# Patient Record
Sex: MALE | Race: WHITE | NOT HISPANIC OR LATINO | Employment: UNEMPLOYED | RURAL
[De-identification: names, ages, dates, MRNs, and addresses within clinical notes are randomized per-mention and may not be internally consistent; named-entity substitution may affect disease eponyms.]

---

## 2020-08-10 ENCOUNTER — HISTORICAL (OUTPATIENT)
Dept: ADMINISTRATIVE | Facility: HOSPITAL | Age: 55
End: 2020-08-10

## 2020-08-10 LAB
25(OH)D3 SERPL-MCNC: 34.1 NG/ML
ALBUMIN SERPL BCP-MCNC: 4 G/DL (ref 3.5–5)
ALBUMIN/GLOB SERPL: 1.2 {RATIO}
ALP SERPL-CCNC: 84 U/L (ref 45–115)
ALT SERPL W P-5'-P-CCNC: 27 U/L (ref 16–61)
ANION GAP SERPL CALCULATED.3IONS-SCNC: 10 MMOL/L (ref 7–16)
AST SERPL W P-5'-P-CCNC: 21 U/L (ref 15–37)
BASOPHILS # BLD AUTO: 0.1 X10E3/UL (ref 0–0.2)
BASOPHILS NFR BLD AUTO: 0.9 % (ref 0–1)
BILIRUB SERPL-MCNC: 0.7 MG/DL (ref 0–1.2)
BUN SERPL-MCNC: 11 MG/DL (ref 7–18)
BUN SERPL-MCNC: 11 MG/DL (ref 7–18)
BUN/CREAT SERPL: 12
CALCIUM SERPL-MCNC: 8.8 MG/DL (ref 8.5–10.1)
CHLORIDE SERPL-SCNC: 104 MMOL/L (ref 98–107)
CO2 SERPL-SCNC: 30 MMOL/L (ref 21–32)
CREAT SERPL-MCNC: 0.89 MG/DL (ref 0.7–1.3)
EOSINOPHIL # BLD AUTO: 0.68 X10E3/UL (ref 0–0.5)
EOSINOPHIL NFR BLD AUTO: 6.2 % (ref 1–4)
ERYTHROCYTE [DISTWIDTH] IN BLOOD BY AUTOMATED COUNT: 14.1 % (ref 11.5–14.5)
GLOBULIN SER-MCNC: 3.3 G/DL (ref 2–4)
GLUCOSE SERPL-MCNC: 91 MG/DL (ref 74–106)
HCT VFR BLD AUTO: 46.6 % (ref 40–54)
HGB BLD-MCNC: 14.8 G/DL (ref 13.5–18)
IMM GRANULOCYTES # BLD AUTO: 0.03 X10E3/UL (ref 0–0.04)
IMM GRANULOCYTES NFR BLD: 0.3 % (ref 0–0.4)
LYMPHOCYTES # BLD AUTO: 2.98 X10E3/UL (ref 1–4.8)
LYMPHOCYTES NFR BLD AUTO: 27.3 % (ref 27–41)
MCH RBC QN AUTO: 29 PG (ref 27–31)
MCHC RBC AUTO-ENTMCNC: 31.8 G/DL (ref 32–36)
MCV RBC AUTO: 91.4 FL (ref 80–96)
MONOCYTES # BLD AUTO: 0.71 X10E3/UL (ref 0–0.8)
MONOCYTES NFR BLD AUTO: 6.5 % (ref 2–6)
MPC BLD CALC-MCNC: 11.6 FL (ref 9.4–12.4)
NEUTROPHILS # BLD AUTO: 6.43 X10E3/UL (ref 1.8–7.7)
NEUTROPHILS NFR BLD AUTO: 58.8 % (ref 53–65)
NRBC # BLD AUTO: 0 X10E3/UL (ref 0–0)
NRBC, AUTO (.00): 0 /100 (ref 0–0)
PLATELET # BLD AUTO: 261 X10E3/UL (ref 150–400)
POTASSIUM SERPL-SCNC: 4.1 MMOL/L (ref 3.5–5.1)
PROT SERPL-MCNC: 7.3 G/DL (ref 6.4–8.2)
RBC # BLD AUTO: 5.1 X10E6/UL (ref 4.6–6.2)
SODIUM SERPL-SCNC: 140 MMOL/L (ref 136–145)
VIT B12 SERPL-MCNC: 580 PG/ML (ref 193–986)
WBC # BLD AUTO: 10.93 X10E3/UL (ref 4.5–11)

## 2020-09-14 ENCOUNTER — HISTORICAL (OUTPATIENT)
Dept: ADMINISTRATIVE | Facility: HOSPITAL | Age: 55
End: 2020-09-14

## 2020-09-14 LAB — NT-PROBNP SERPL-MCNC: 56 PG/ML (ref 1–125)

## 2022-03-10 ENCOUNTER — OFFICE VISIT (OUTPATIENT)
Dept: FAMILY MEDICINE | Facility: CLINIC | Age: 57
End: 2022-03-10
Payer: MEDICARE

## 2022-03-10 VITALS
RESPIRATION RATE: 18 BRPM | WEIGHT: 231.63 LBS | SYSTOLIC BLOOD PRESSURE: 130 MMHG | BODY MASS INDEX: 32.43 KG/M2 | DIASTOLIC BLOOD PRESSURE: 85 MMHG | HEIGHT: 71 IN | TEMPERATURE: 97 F | HEART RATE: 76 BPM | OXYGEN SATURATION: 95 %

## 2022-03-10 DIAGNOSIS — Z12.5 SCREENING FOR PROSTATE CANCER: ICD-10-CM

## 2022-03-10 DIAGNOSIS — F41.9 ANXIETY: ICD-10-CM

## 2022-03-10 DIAGNOSIS — J44.9 CHRONIC OBSTRUCTIVE PULMONARY DISEASE, UNSPECIFIED COPD TYPE: Primary | ICD-10-CM

## 2022-03-10 DIAGNOSIS — Z13.1 SCREENING FOR DIABETES MELLITUS: ICD-10-CM

## 2022-03-10 DIAGNOSIS — K21.9 GASTROESOPHAGEAL REFLUX DISEASE, UNSPECIFIED WHETHER ESOPHAGITIS PRESENT: ICD-10-CM

## 2022-03-10 DIAGNOSIS — E78.5 HYPERLIPIDEMIA, UNSPECIFIED HYPERLIPIDEMIA TYPE: ICD-10-CM

## 2022-03-10 DIAGNOSIS — R53.83 FATIGUE, UNSPECIFIED TYPE: ICD-10-CM

## 2022-03-10 LAB
ALBUMIN SERPL BCP-MCNC: 3.6 G/DL (ref 3.5–5)
ALBUMIN/GLOB SERPL: 0.9 {RATIO}
ALP SERPL-CCNC: 118 U/L (ref 45–115)
ALT SERPL W P-5'-P-CCNC: 28 U/L (ref 16–61)
ANION GAP SERPL CALCULATED.3IONS-SCNC: 9 MMOL/L (ref 7–16)
AST SERPL W P-5'-P-CCNC: 20 U/L (ref 15–37)
BASOPHILS # BLD AUTO: 0.08 K/UL (ref 0–0.2)
BASOPHILS NFR BLD AUTO: 0.6 % (ref 0–1)
BILIRUB SERPL-MCNC: 0.7 MG/DL (ref 0–1.2)
BUN SERPL-MCNC: 8 MG/DL (ref 7–18)
BUN/CREAT SERPL: 7 (ref 6–20)
CALCIUM SERPL-MCNC: 9.1 MG/DL (ref 8.5–10.1)
CHLORIDE SERPL-SCNC: 102 MMOL/L (ref 98–107)
CHOLEST SERPL-MCNC: 149 MG/DL (ref 0–200)
CHOLEST/HDLC SERPL: 3.4 {RATIO}
CO2 SERPL-SCNC: 28 MMOL/L (ref 21–32)
CREAT SERPL-MCNC: 1.08 MG/DL (ref 0.7–1.3)
DIFFERENTIAL METHOD BLD: ABNORMAL
EOSINOPHIL # BLD AUTO: 0.2 K/UL (ref 0–0.5)
EOSINOPHIL NFR BLD AUTO: 1.5 % (ref 1–4)
ERYTHROCYTE [DISTWIDTH] IN BLOOD BY AUTOMATED COUNT: 14 % (ref 11.5–14.5)
GLOBULIN SER-MCNC: 4.1 G/DL (ref 2–4)
GLUCOSE SERPL-MCNC: 87 MG/DL (ref 74–106)
HCT VFR BLD AUTO: 51.8 % (ref 40–54)
HDLC SERPL-MCNC: 44 MG/DL (ref 40–60)
HGB BLD-MCNC: 16.6 G/DL (ref 13.5–18)
IMM GRANULOCYTES # BLD AUTO: 0.03 K/UL (ref 0–0.04)
IMM GRANULOCYTES NFR BLD: 0.2 % (ref 0–0.4)
LDLC SERPL CALC-MCNC: 87 MG/DL
LDLC/HDLC SERPL: 2 {RATIO}
LYMPHOCYTES # BLD AUTO: 3.17 K/UL (ref 1–4.8)
LYMPHOCYTES NFR BLD AUTO: 24.3 % (ref 27–41)
MCH RBC QN AUTO: 28.3 PG (ref 27–31)
MCHC RBC AUTO-ENTMCNC: 32 G/DL (ref 32–36)
MCV RBC AUTO: 88.4 FL (ref 80–96)
MONOCYTES # BLD AUTO: 0.73 K/UL (ref 0–0.8)
MONOCYTES NFR BLD AUTO: 5.6 % (ref 2–6)
MPC BLD CALC-MCNC: 11.5 FL (ref 9.4–12.4)
NEUTROPHILS # BLD AUTO: 8.84 K/UL (ref 1.8–7.7)
NEUTROPHILS NFR BLD AUTO: 67.8 % (ref 53–65)
NONHDLC SERPL-MCNC: 105 MG/DL
NRBC # BLD AUTO: 0 X10E3/UL
NRBC, AUTO (.00): 0 %
PLATELET # BLD AUTO: 287 K/UL (ref 150–400)
POTASSIUM SERPL-SCNC: 3.9 MMOL/L (ref 3.5–5.1)
PROT SERPL-MCNC: 7.7 G/DL (ref 6.4–8.2)
PSA SERPL-MCNC: 0.96 NG/ML (ref 0–3.1)
RBC # BLD AUTO: 5.86 M/UL (ref 4.6–6.2)
SODIUM SERPL-SCNC: 135 MMOL/L (ref 136–145)
TRIGL SERPL-MCNC: 90 MG/DL (ref 35–150)
TSH SERPL DL<=0.005 MIU/L-ACNC: 1.81 UIU/ML (ref 0.36–3.74)
VLDLC SERPL-MCNC: 18 MG/DL
WBC # BLD AUTO: 13.05 K/UL (ref 4.5–11)

## 2022-03-10 PROCEDURE — 1159F MED LIST DOCD IN RCRD: CPT | Mod: ,,, | Performed by: NURSE PRACTITIONER

## 2022-03-10 PROCEDURE — 99204 OFFICE O/P NEW MOD 45 MIN: CPT | Mod: ,,, | Performed by: NURSE PRACTITIONER

## 2022-03-10 PROCEDURE — 3008F PR BODY MASS INDEX (BMI) DOCUMENTED: ICD-10-PCS | Mod: ,,, | Performed by: NURSE PRACTITIONER

## 2022-03-10 PROCEDURE — G0103 PSA SCREENING: HCPCS | Mod: ,,, | Performed by: CLINICAL MEDICAL LABORATORY

## 2022-03-10 PROCEDURE — 1160F RVW MEDS BY RX/DR IN RCRD: CPT | Mod: ,,, | Performed by: NURSE PRACTITIONER

## 2022-03-10 PROCEDURE — 3008F BODY MASS INDEX DOCD: CPT | Mod: ,,, | Performed by: NURSE PRACTITIONER

## 2022-03-10 PROCEDURE — 3079F DIAST BP 80-89 MM HG: CPT | Mod: ,,, | Performed by: NURSE PRACTITIONER

## 2022-03-10 PROCEDURE — 80061 LIPID PANEL: ICD-10-PCS | Mod: ,,, | Performed by: CLINICAL MEDICAL LABORATORY

## 2022-03-10 PROCEDURE — 80061 LIPID PANEL: CPT | Mod: ,,, | Performed by: CLINICAL MEDICAL LABORATORY

## 2022-03-10 PROCEDURE — G0103 PSA, SCREENING: ICD-10-PCS | Mod: ,,, | Performed by: CLINICAL MEDICAL LABORATORY

## 2022-03-10 PROCEDURE — 99204 PR OFFICE/OUTPT VISIT, NEW, LEVL IV, 45-59 MIN: ICD-10-PCS | Mod: ,,, | Performed by: NURSE PRACTITIONER

## 2022-03-10 PROCEDURE — 1159F PR MEDICATION LIST DOCUMENTED IN MEDICAL RECORD: ICD-10-PCS | Mod: ,,, | Performed by: NURSE PRACTITIONER

## 2022-03-10 PROCEDURE — 83036 HEMOGLOBIN A1C: ICD-10-PCS | Mod: GZ,,, | Performed by: CLINICAL MEDICAL LABORATORY

## 2022-03-10 PROCEDURE — 84443 ASSAY THYROID STIM HORMONE: CPT | Mod: ,,, | Performed by: CLINICAL MEDICAL LABORATORY

## 2022-03-10 PROCEDURE — 80053 COMPREHEN METABOLIC PANEL: CPT | Mod: ,,, | Performed by: CLINICAL MEDICAL LABORATORY

## 2022-03-10 PROCEDURE — 1160F PR REVIEW ALL MEDS BY PRESCRIBER/CLIN PHARMACIST DOCUMENTED: ICD-10-PCS | Mod: ,,, | Performed by: NURSE PRACTITIONER

## 2022-03-10 PROCEDURE — 80053 COMPREHENSIVE METABOLIC PANEL: ICD-10-PCS | Mod: ,,, | Performed by: CLINICAL MEDICAL LABORATORY

## 2022-03-10 PROCEDURE — 84443 TSH: ICD-10-PCS | Mod: ,,, | Performed by: CLINICAL MEDICAL LABORATORY

## 2022-03-10 PROCEDURE — 3075F SYST BP GE 130 - 139MM HG: CPT | Mod: ,,, | Performed by: NURSE PRACTITIONER

## 2022-03-10 PROCEDURE — 83036 HEMOGLOBIN GLYCOSYLATED A1C: CPT | Mod: GZ,,, | Performed by: CLINICAL MEDICAL LABORATORY

## 2022-03-10 PROCEDURE — 85025 COMPLETE CBC W/AUTO DIFF WBC: CPT | Mod: ,,, | Performed by: CLINICAL MEDICAL LABORATORY

## 2022-03-10 PROCEDURE — 3079F PR MOST RECENT DIASTOLIC BLOOD PRESSURE 80-89 MM HG: ICD-10-PCS | Mod: ,,, | Performed by: NURSE PRACTITIONER

## 2022-03-10 PROCEDURE — 85025 CBC WITH DIFFERENTIAL: ICD-10-PCS | Mod: ,,, | Performed by: CLINICAL MEDICAL LABORATORY

## 2022-03-10 PROCEDURE — 3075F PR MOST RECENT SYSTOLIC BLOOD PRESS GE 130-139MM HG: ICD-10-PCS | Mod: ,,, | Performed by: NURSE PRACTITIONER

## 2022-03-10 RX ORDER — ALBUTEROL SULFATE 90 UG/1
AEROSOL, METERED RESPIRATORY (INHALATION)
Qty: 18 G | Refills: 2 | Status: SHIPPED | OUTPATIENT
Start: 2022-03-10 | End: 2022-08-22 | Stop reason: SDUPTHER

## 2022-03-10 RX ORDER — ALBUTEROL SULFATE 90 UG/1
AEROSOL, METERED RESPIRATORY (INHALATION)
COMMUNITY
End: 2022-03-10 | Stop reason: SDUPTHER

## 2022-03-10 RX ORDER — BUSPIRONE HYDROCHLORIDE 10 MG/1
1 TABLET ORAL DAILY
COMMUNITY
End: 2022-03-10 | Stop reason: SDUPTHER

## 2022-03-10 RX ORDER — ATORVASTATIN CALCIUM 40 MG/1
1 TABLET, FILM COATED ORAL DAILY
COMMUNITY
End: 2022-03-10 | Stop reason: SDUPTHER

## 2022-03-10 RX ORDER — ATORVASTATIN CALCIUM 40 MG/1
40 TABLET, FILM COATED ORAL DAILY
Qty: 90 TABLET | Refills: 1 | Status: SHIPPED | OUTPATIENT
Start: 2022-03-10 | End: 2022-08-22 | Stop reason: SDUPTHER

## 2022-03-10 RX ORDER — ALBUTEROL SULFATE 0.83 MG/ML
2.5 SOLUTION RESPIRATORY (INHALATION) 2 TIMES DAILY
Qty: 3 ML | Refills: 5 | Status: SHIPPED | OUTPATIENT
Start: 2022-03-10 | End: 2023-01-12 | Stop reason: SDUPTHER

## 2022-03-10 RX ORDER — BUDESONIDE AND FORMOTEROL FUMARATE DIHYDRATE 160; 4.5 UG/1; UG/1
AEROSOL RESPIRATORY (INHALATION)
Qty: 10.2 G | Refills: 5 | Status: SHIPPED | OUTPATIENT
Start: 2022-03-10 | End: 2022-08-22

## 2022-03-10 RX ORDER — BUDESONIDE AND FORMOTEROL FUMARATE DIHYDRATE 160; 4.5 UG/1; UG/1
AEROSOL RESPIRATORY (INHALATION)
COMMUNITY
End: 2022-03-10 | Stop reason: SDUPTHER

## 2022-03-10 RX ORDER — PANTOPRAZOLE SODIUM 40 MG/1
40 TABLET, DELAYED RELEASE ORAL DAILY
Qty: 90 TABLET | Refills: 1 | Status: SHIPPED | OUTPATIENT
Start: 2022-03-10 | End: 2022-08-22 | Stop reason: SDUPTHER

## 2022-03-10 RX ORDER — TIOTROPIUM BROMIDE 18 UG/1
18 CAPSULE ORAL; RESPIRATORY (INHALATION) DAILY
Qty: 60 CAPSULE | Refills: 5 | Status: SHIPPED | OUTPATIENT
Start: 2022-03-10 | End: 2022-08-22 | Stop reason: ALTCHOICE

## 2022-03-10 RX ORDER — ALBUTEROL SULFATE 0.83 MG/ML
3 SOLUTION RESPIRATORY (INHALATION)
COMMUNITY
End: 2022-03-10 | Stop reason: SDUPTHER

## 2022-03-10 RX ORDER — BUSPIRONE HYDROCHLORIDE 10 MG/1
10 TABLET ORAL DAILY
Qty: 90 TABLET | Refills: 1 | Status: SHIPPED | OUTPATIENT
Start: 2022-03-10 | End: 2022-05-19

## 2022-03-10 RX ORDER — PANTOPRAZOLE SODIUM 40 MG/1
1 TABLET, DELAYED RELEASE ORAL DAILY
COMMUNITY
End: 2022-03-10 | Stop reason: SDUPTHER

## 2022-03-10 RX ORDER — GABAPENTIN 300 MG/1
300 CAPSULE ORAL 2 TIMES DAILY
Qty: 270 CAPSULE | Refills: 1 | Status: SHIPPED | OUTPATIENT
Start: 2022-03-10 | End: 2022-05-19

## 2022-03-10 RX ORDER — GABAPENTIN 300 MG/1
300 CAPSULE ORAL 2 TIMES DAILY
COMMUNITY
End: 2022-03-10 | Stop reason: SDUPTHER

## 2022-03-10 NOTE — PROGRESS NOTES
Subjective:       Patient ID: Andrez Wen is a 56 y.o. male.    Chief Complaint: Establish Care (COPD,  Pt is non fasting )    Mr. Wen presents to clinic to establish care, he is requesting medication refills. He reports that he recently got medical insurance coverage and was going to the [facility] in the past. He states he was a patient of Dr. Gee in the past. He denies complaints, reports he was in [facility] approximately 4 years ago for two weeks and had an extensive work up.    Review of Systems   Constitutional: Negative.    Respiratory: Positive for shortness of breath and wheezing.    Cardiovascular: Negative.    Gastrointestinal: Negative.    Genitourinary: Negative.    Musculoskeletal: Positive for back pain.   Neurological: Negative.    Psychiatric/Behavioral: Negative.          Objective:      Physical Exam  Vitals and nursing note reviewed.   Constitutional:       Appearance: Normal appearance.   HENT:      Head: Normocephalic.   Eyes:      Pupils: Pupils are equal, round, and reactive to light.   Cardiovascular:      Rate and Rhythm: Normal rate and regular rhythm.      Pulses: Normal pulses.      Heart sounds: Normal heart sounds.   Pulmonary:      Effort: Pulmonary effort is normal. No respiratory distress.      Breath sounds: Wheezing present.   Abdominal:      General: Bowel sounds are normal.      Palpations: Abdomen is soft.   Musculoskeletal:         General: Tenderness present. Normal range of motion.      Cervical back: Normal range of motion.      Right lower leg: No edema.      Left lower leg: No edema.   Skin:     General: Skin is warm and dry.   Neurological:      Mental Status: He is alert and oriented to person, place, and time.   Psychiatric:         Behavior: Behavior normal.         Assessment:       Problem List Items Addressed This Visit        Psychiatric    Anxiety       Pulmonary    Chronic obstructive pulmonary disease - Primary    Relevant Orders     Comprehensive Metabolic Panel       Cardiac/Vascular    Hyperlipidemia    Relevant Orders    Lipid Panel    CBC Auto Differential       GI    Gastroesophageal reflux disease    Relevant Orders    CBC Auto Differential    Comprehensive Metabolic Panel      Other Visit Diagnoses     Fatigue, unspecified type        Relevant Orders    TSH    Screening for prostate cancer        Relevant Orders    PSA, Screening    Screening for diabetes mellitus        Relevant Orders    Hemoglobin A1C          Plan:        1. Fasting labs today, schedule follow up appointment with Dr. Gee.   2. Refill medications, no changes made today.    3. Smoking cessation counseling.    4. [facility] records to chart from 4 years ago, he reports extensive work up at that time.

## 2022-03-11 LAB
EST. AVERAGE GLUCOSE BLD GHB EST-MCNC: 104 MG/DL
HBA1C MFR BLD HPLC: 5.7 % (ref 4.5–6.6)

## 2022-05-19 ENCOUNTER — OFFICE VISIT (OUTPATIENT)
Dept: FAMILY MEDICINE | Facility: CLINIC | Age: 57
End: 2022-05-19
Payer: MEDICARE

## 2022-05-19 VITALS
TEMPERATURE: 97 F | DIASTOLIC BLOOD PRESSURE: 80 MMHG | BODY MASS INDEX: 32.34 KG/M2 | SYSTOLIC BLOOD PRESSURE: 136 MMHG | WEIGHT: 231 LBS | OXYGEN SATURATION: 97 % | RESPIRATION RATE: 18 BRPM | HEART RATE: 71 BPM | HEIGHT: 71 IN

## 2022-05-19 DIAGNOSIS — F41.9 ANXIETY: ICD-10-CM

## 2022-05-19 DIAGNOSIS — G89.29 CHRONIC MIDLINE LOW BACK PAIN WITHOUT SCIATICA: Primary | ICD-10-CM

## 2022-05-19 DIAGNOSIS — J44.9 CHRONIC OBSTRUCTIVE PULMONARY DISEASE, UNSPECIFIED COPD TYPE: ICD-10-CM

## 2022-05-19 DIAGNOSIS — K21.9 GASTROESOPHAGEAL REFLUX DISEASE, UNSPECIFIED WHETHER ESOPHAGITIS PRESENT: ICD-10-CM

## 2022-05-19 DIAGNOSIS — E78.5 HYPERLIPIDEMIA, UNSPECIFIED HYPERLIPIDEMIA TYPE: ICD-10-CM

## 2022-05-19 DIAGNOSIS — M54.50 CHRONIC MIDLINE LOW BACK PAIN WITHOUT SCIATICA: Primary | ICD-10-CM

## 2022-05-19 PROCEDURE — 99213 PR OFFICE/OUTPT VISIT, EST, LEVL III, 20-29 MIN: ICD-10-PCS | Mod: ,,, | Performed by: NURSE PRACTITIONER

## 2022-05-19 PROCEDURE — 1159F PR MEDICATION LIST DOCUMENTED IN MEDICAL RECORD: ICD-10-PCS | Mod: ,,, | Performed by: NURSE PRACTITIONER

## 2022-05-19 PROCEDURE — 3079F DIAST BP 80-89 MM HG: CPT | Mod: ,,, | Performed by: NURSE PRACTITIONER

## 2022-05-19 PROCEDURE — 1160F PR REVIEW ALL MEDS BY PRESCRIBER/CLIN PHARMACIST DOCUMENTED: ICD-10-PCS | Mod: ,,, | Performed by: NURSE PRACTITIONER

## 2022-05-19 PROCEDURE — 3075F PR MOST RECENT SYSTOLIC BLOOD PRESS GE 130-139MM HG: ICD-10-PCS | Mod: ,,, | Performed by: NURSE PRACTITIONER

## 2022-05-19 PROCEDURE — 3079F PR MOST RECENT DIASTOLIC BLOOD PRESSURE 80-89 MM HG: ICD-10-PCS | Mod: ,,, | Performed by: NURSE PRACTITIONER

## 2022-05-19 PROCEDURE — 3008F BODY MASS INDEX DOCD: CPT | Mod: ,,, | Performed by: NURSE PRACTITIONER

## 2022-05-19 PROCEDURE — 3044F PR MOST RECENT HEMOGLOBIN A1C LEVEL <7.0%: ICD-10-PCS | Mod: ,,, | Performed by: NURSE PRACTITIONER

## 2022-05-19 PROCEDURE — 3044F HG A1C LEVEL LT 7.0%: CPT | Mod: ,,, | Performed by: NURSE PRACTITIONER

## 2022-05-19 PROCEDURE — 1160F RVW MEDS BY RX/DR IN RCRD: CPT | Mod: ,,, | Performed by: NURSE PRACTITIONER

## 2022-05-19 PROCEDURE — 1159F MED LIST DOCD IN RCRD: CPT | Mod: ,,, | Performed by: NURSE PRACTITIONER

## 2022-05-19 PROCEDURE — 3008F PR BODY MASS INDEX (BMI) DOCUMENTED: ICD-10-PCS | Mod: ,,, | Performed by: NURSE PRACTITIONER

## 2022-05-19 PROCEDURE — 3075F SYST BP GE 130 - 139MM HG: CPT | Mod: ,,, | Performed by: NURSE PRACTITIONER

## 2022-05-19 PROCEDURE — 99213 OFFICE O/P EST LOW 20 MIN: CPT | Mod: ,,, | Performed by: NURSE PRACTITIONER

## 2022-05-19 RX ORDER — GABAPENTIN 300 MG/1
300 CAPSULE ORAL 2 TIMES DAILY
Qty: 270 CAPSULE | Refills: 1 | Status: SHIPPED | OUTPATIENT
Start: 2022-05-19 | End: 2022-05-19

## 2022-05-19 RX ORDER — GABAPENTIN 300 MG/1
600 CAPSULE ORAL 2 TIMES DAILY
Qty: 360 CAPSULE | Refills: 1 | Status: SHIPPED | OUTPATIENT
Start: 2022-05-19 | End: 2022-08-22 | Stop reason: SDUPTHER

## 2022-05-19 RX ORDER — MELOXICAM 7.5 MG/1
7.5 TABLET ORAL DAILY
Qty: 30 TABLET | Refills: 1 | Status: SHIPPED | OUTPATIENT
Start: 2022-05-19 | End: 2022-08-18

## 2022-05-19 RX ORDER — CYCLOBENZAPRINE HCL 10 MG
10 TABLET ORAL 3 TIMES DAILY PRN
Qty: 30 TABLET | Refills: 1 | Status: SHIPPED | OUTPATIENT
Start: 2022-05-19 | End: 2022-05-29

## 2022-05-19 RX ORDER — MELOXICAM 7.5 MG/1
7.5 TABLET ORAL DAILY
Qty: 30 TABLET | Refills: 1 | Status: SHIPPED | OUTPATIENT
Start: 2022-05-19 | End: 2022-05-19 | Stop reason: SDUPTHER

## 2022-05-19 RX ORDER — CYCLOBENZAPRINE HCL 10 MG
10 TABLET ORAL 3 TIMES DAILY PRN
Qty: 30 TABLET | Refills: 1 | Status: SHIPPED | OUTPATIENT
Start: 2022-05-19 | End: 2022-05-19 | Stop reason: SDUPTHER

## 2022-05-19 NOTE — PROGRESS NOTES
Subjective:       Patient ID: Andrez Wen is a 56 y.o. male.    Chief Complaint: Follow-up (Missed last appt due to daughter having brain surgery in [place] . Would like to talk about medication changes )    Mr. Wen presents in follow up and to discuss recent lab results. He complains of low back pain that is worse at night and he does not feel that neurontin is helping with his pain.    Review of Systems   Constitutional: Negative.    Respiratory: Negative.    Cardiovascular: Negative.    Gastrointestinal: Negative.    Genitourinary: Negative.    Musculoskeletal: Positive for back pain and leg pain.   Neurological: Negative.    Psychiatric/Behavioral: Negative.          Objective:      Physical Exam  Vitals and nursing note reviewed.   Constitutional:       Appearance: Normal appearance.   HENT:      Head: Normocephalic.   Eyes:      Pupils: Pupils are equal, round, and reactive to light.   Cardiovascular:      Rate and Rhythm: Normal rate and regular rhythm.      Pulses: Normal pulses.      Heart sounds: Normal heart sounds.   Pulmonary:      Effort: Pulmonary effort is normal.      Breath sounds: Normal breath sounds.   Abdominal:      General: Bowel sounds are normal.      Palpations: Abdomen is soft.   Musculoskeletal:         General: Tenderness present. Normal range of motion.      Lumbar back: Spasms and tenderness present.   Skin:     General: Skin is warm and dry.   Neurological:      Mental Status: He is alert and oriented to person, place, and time.   Psychiatric:         Behavior: Behavior normal.         Assessment:       Problem List Items Addressed This Visit        Psychiatric    Anxiety       Pulmonary    Chronic obstructive pulmonary disease       Cardiac/Vascular    Hyperlipidemia       GI    Gastroesophageal reflux disease      Other Visit Diagnoses     Chronic midline low back pain without sciatica    -  Primary          Plan:        Refill Neurontin, add flexeril for night time and  trial of mobic (take with food).  Instructed patient to stop motrin and take mobic.

## 2022-08-22 ENCOUNTER — OFFICE VISIT (OUTPATIENT)
Dept: FAMILY MEDICINE | Facility: CLINIC | Age: 57
End: 2022-08-22
Payer: MEDICARE

## 2022-08-22 VITALS
DIASTOLIC BLOOD PRESSURE: 86 MMHG | OXYGEN SATURATION: 96 % | SYSTOLIC BLOOD PRESSURE: 132 MMHG | HEIGHT: 71 IN | WEIGHT: 220 LBS | HEART RATE: 62 BPM | TEMPERATURE: 98 F | BODY MASS INDEX: 30.8 KG/M2 | RESPIRATION RATE: 18 BRPM

## 2022-08-22 DIAGNOSIS — M54.50 CHRONIC MIDLINE LOW BACK PAIN WITHOUT SCIATICA: ICD-10-CM

## 2022-08-22 DIAGNOSIS — R73.03 PREDIABETES: ICD-10-CM

## 2022-08-22 DIAGNOSIS — E78.5 HYPERLIPIDEMIA, UNSPECIFIED HYPERLIPIDEMIA TYPE: ICD-10-CM

## 2022-08-22 DIAGNOSIS — J44.9 CHRONIC OBSTRUCTIVE PULMONARY DISEASE, UNSPECIFIED COPD TYPE: Primary | ICD-10-CM

## 2022-08-22 DIAGNOSIS — G89.29 CHRONIC MIDLINE LOW BACK PAIN WITHOUT SCIATICA: ICD-10-CM

## 2022-08-22 DIAGNOSIS — K21.9 GASTROESOPHAGEAL REFLUX DISEASE, UNSPECIFIED WHETHER ESOPHAGITIS PRESENT: ICD-10-CM

## 2022-08-22 DIAGNOSIS — R05.9 COUGH: ICD-10-CM

## 2022-08-22 DIAGNOSIS — R07.9 CHEST PAIN, UNSPECIFIED TYPE: ICD-10-CM

## 2022-08-22 LAB
ALBUMIN SERPL BCP-MCNC: 3.6 G/DL (ref 3.5–5)
ALBUMIN/GLOB SERPL: 0.9 {RATIO}
ALP SERPL-CCNC: 115 U/L (ref 45–115)
ALT SERPL W P-5'-P-CCNC: 34 U/L (ref 16–61)
ANION GAP SERPL CALCULATED.3IONS-SCNC: 11 MMOL/L (ref 7–16)
AST SERPL W P-5'-P-CCNC: 26 U/L (ref 15–37)
BILIRUB SERPL-MCNC: 0.6 MG/DL (ref 0–1.2)
BUN SERPL-MCNC: 7 MG/DL (ref 7–18)
BUN/CREAT SERPL: 8 (ref 6–20)
CALCIUM SERPL-MCNC: 9 MG/DL (ref 8.5–10.1)
CHLORIDE SERPL-SCNC: 104 MMOL/L (ref 98–107)
CO2 SERPL-SCNC: 27 MMOL/L (ref 21–32)
CREAT SERPL-MCNC: 0.92 MG/DL (ref 0.7–1.3)
EGFR (NO RACE VARIABLE) (RUSH/TITUS): 96 ML/MIN/1.73M²
EST. AVERAGE GLUCOSE BLD GHB EST-MCNC: 104 MG/DL
GLOBULIN SER-MCNC: 4.2 G/DL (ref 2–4)
GLUCOSE SERPL-MCNC: 82 MG/DL (ref 74–106)
HBA1C MFR BLD HPLC: 5.7 % (ref 4.5–6.6)
POTASSIUM SERPL-SCNC: 3.9 MMOL/L (ref 3.5–5.1)
PROT SERPL-MCNC: 7.8 G/DL (ref 6.4–8.2)
SODIUM SERPL-SCNC: 138 MMOL/L (ref 136–145)

## 2022-08-22 PROCEDURE — 1159F PR MEDICATION LIST DOCUMENTED IN MEDICAL RECORD: ICD-10-PCS | Mod: ,,, | Performed by: NURSE PRACTITIONER

## 2022-08-22 PROCEDURE — 99212 OFFICE O/P EST SF 10 MIN: CPT | Mod: ,,, | Performed by: NURSE PRACTITIONER

## 2022-08-22 PROCEDURE — 1160F PR REVIEW ALL MEDS BY PRESCRIBER/CLIN PHARMACIST DOCUMENTED: ICD-10-PCS | Mod: ,,, | Performed by: NURSE PRACTITIONER

## 2022-08-22 PROCEDURE — 3044F PR MOST RECENT HEMOGLOBIN A1C LEVEL <7.0%: ICD-10-PCS | Mod: ,,, | Performed by: NURSE PRACTITIONER

## 2022-08-22 PROCEDURE — 1160F RVW MEDS BY RX/DR IN RCRD: CPT | Mod: ,,, | Performed by: NURSE PRACTITIONER

## 2022-08-22 PROCEDURE — 83036 HEMOGLOBIN GLYCOSYLATED A1C: CPT | Mod: ,,, | Performed by: CLINICAL MEDICAL LABORATORY

## 2022-08-22 PROCEDURE — 3008F PR BODY MASS INDEX (BMI) DOCUMENTED: ICD-10-PCS | Mod: ,,, | Performed by: NURSE PRACTITIONER

## 2022-08-22 PROCEDURE — 3044F HG A1C LEVEL LT 7.0%: CPT | Mod: ,,, | Performed by: NURSE PRACTITIONER

## 2022-08-22 PROCEDURE — 99212 PR OFFICE/OUTPT VISIT, EST, LEVL II, 10-19 MIN: ICD-10-PCS | Mod: ,,, | Performed by: NURSE PRACTITIONER

## 2022-08-22 PROCEDURE — 3075F PR MOST RECENT SYSTOLIC BLOOD PRESS GE 130-139MM HG: ICD-10-PCS | Mod: ,,, | Performed by: NURSE PRACTITIONER

## 2022-08-22 PROCEDURE — 3075F SYST BP GE 130 - 139MM HG: CPT | Mod: ,,, | Performed by: NURSE PRACTITIONER

## 2022-08-22 PROCEDURE — 3079F DIAST BP 80-89 MM HG: CPT | Mod: ,,, | Performed by: NURSE PRACTITIONER

## 2022-08-22 PROCEDURE — 83036 HEMOGLOBIN A1C: ICD-10-PCS | Mod: ,,, | Performed by: CLINICAL MEDICAL LABORATORY

## 2022-08-22 PROCEDURE — 3008F BODY MASS INDEX DOCD: CPT | Mod: ,,, | Performed by: NURSE PRACTITIONER

## 2022-08-22 PROCEDURE — 3079F PR MOST RECENT DIASTOLIC BLOOD PRESSURE 80-89 MM HG: ICD-10-PCS | Mod: ,,, | Performed by: NURSE PRACTITIONER

## 2022-08-22 PROCEDURE — 80053 COMPREHEN METABOLIC PANEL: CPT | Mod: ,,, | Performed by: CLINICAL MEDICAL LABORATORY

## 2022-08-22 PROCEDURE — 80053 COMPREHENSIVE METABOLIC PANEL: ICD-10-PCS | Mod: ,,, | Performed by: CLINICAL MEDICAL LABORATORY

## 2022-08-22 PROCEDURE — 1159F MED LIST DOCD IN RCRD: CPT | Mod: ,,, | Performed by: NURSE PRACTITIONER

## 2022-08-22 RX ORDER — ALBUTEROL SULFATE 90 UG/1
AEROSOL, METERED RESPIRATORY (INHALATION)
Qty: 18 G | Refills: 2 | Status: SHIPPED | OUTPATIENT
Start: 2022-08-22 | End: 2022-08-22 | Stop reason: SDUPTHER

## 2022-08-22 RX ORDER — GABAPENTIN 300 MG/1
600 CAPSULE ORAL 2 TIMES DAILY
Qty: 360 CAPSULE | Refills: 1 | Status: SHIPPED | OUTPATIENT
Start: 2022-08-22 | End: 2022-11-16 | Stop reason: SDUPTHER

## 2022-08-22 RX ORDER — BUDESONIDE, GLYCOPYRROLATE, AND FORMOTEROL FUMARATE 160; 9; 4.8 UG/1; UG/1; UG/1
2 AEROSOL, METERED RESPIRATORY (INHALATION) 2 TIMES DAILY
Qty: 10.7 G | Refills: 2 | Status: SHIPPED | OUTPATIENT
Start: 2022-08-22 | End: 2022-11-16 | Stop reason: SDUPTHER

## 2022-08-22 RX ORDER — ALBUTEROL SULFATE 90 UG/1
AEROSOL, METERED RESPIRATORY (INHALATION)
Qty: 18 G | Refills: 2 | Status: SHIPPED | OUTPATIENT
Start: 2022-08-22 | End: 2023-01-12 | Stop reason: SDUPTHER

## 2022-08-22 RX ORDER — TIOTROPIUM BROMIDE 18 UG/1
18 CAPSULE ORAL; RESPIRATORY (INHALATION) DAILY
Qty: 60 CAPSULE | Refills: 5 | Status: CANCELLED | OUTPATIENT
Start: 2022-08-22 | End: 2023-08-22

## 2022-08-22 RX ORDER — MELOXICAM 7.5 MG/1
7.5 TABLET ORAL DAILY
Qty: 60 TABLET | Refills: 1 | Status: SHIPPED | OUTPATIENT
Start: 2022-08-22 | End: 2022-11-16 | Stop reason: SDUPTHER

## 2022-08-22 RX ORDER — CYCLOBENZAPRINE HCL 10 MG
10 TABLET ORAL 3 TIMES DAILY PRN
COMMUNITY
End: 2022-08-22 | Stop reason: SDUPTHER

## 2022-08-22 RX ORDER — CYCLOBENZAPRINE HCL 10 MG
10 TABLET ORAL 3 TIMES DAILY PRN
Qty: 90 TABLET | Refills: 2 | Status: SHIPPED | OUTPATIENT
Start: 2022-08-22 | End: 2023-01-12 | Stop reason: ALTCHOICE

## 2022-08-22 RX ORDER — ATORVASTATIN CALCIUM 40 MG/1
40 TABLET, FILM COATED ORAL DAILY
Qty: 90 TABLET | Refills: 1 | Status: SHIPPED | OUTPATIENT
Start: 2022-08-22 | End: 2022-08-22 | Stop reason: SDUPTHER

## 2022-08-22 RX ORDER — GABAPENTIN 300 MG/1
600 CAPSULE ORAL 2 TIMES DAILY
Qty: 360 CAPSULE | Refills: 1 | Status: SHIPPED | OUTPATIENT
Start: 2022-08-22 | End: 2022-08-22 | Stop reason: SDUPTHER

## 2022-08-22 RX ORDER — BUDESONIDE AND FORMOTEROL FUMARATE DIHYDRATE 160; 4.5 UG/1; UG/1
AEROSOL RESPIRATORY (INHALATION)
Qty: 10.2 G | Refills: 5 | Status: CANCELLED | OUTPATIENT
Start: 2022-08-22

## 2022-08-22 RX ORDER — ATORVASTATIN CALCIUM 40 MG/1
40 TABLET, FILM COATED ORAL DAILY
Qty: 90 TABLET | Refills: 1 | Status: SHIPPED | OUTPATIENT
Start: 2022-08-22 | End: 2022-11-16 | Stop reason: SDUPTHER

## 2022-08-22 RX ORDER — PANTOPRAZOLE SODIUM 40 MG/1
40 TABLET, DELAYED RELEASE ORAL DAILY
Qty: 90 TABLET | Refills: 1 | Status: SHIPPED | OUTPATIENT
Start: 2022-08-22 | End: 2022-11-16 | Stop reason: SDUPTHER

## 2022-08-22 RX ORDER — CYCLOBENZAPRINE HCL 10 MG
10 TABLET ORAL 3 TIMES DAILY PRN
Qty: 30 TABLET | Status: CANCELLED | OUTPATIENT
Start: 2022-08-22

## 2022-08-22 NOTE — PROGRESS NOTES
Subjective:       Patient ID: Andrez Wen is a 56 y.o. male.    Chief Complaint: Medication Refill (3 month f/u)    Mr. Wen presents to clinic in follow up, he complains of chest discomfort with sudden onset that is sharp and stabbing, he states it starts below the rib cage goes across and up to the right side of the chest wall. He denies nausea, diaphoresis, and dyspnea with pain. Comes and goes quickly, reports he has dyspnea on exertion that comes and goes from COPD. Repeat nonfasting labs today.    Review of Systems   Constitutional: Positive for unexpected weight change.   Respiratory: Positive for cough, shortness of breath and wheezing.    Cardiovascular: Positive for chest pain. Negative for palpitations and leg swelling.   Gastrointestinal: Negative.    Musculoskeletal: Positive for back pain.   Neurological: Negative.    Psychiatric/Behavioral: Negative.          Objective:      Physical Exam  Vitals and nursing note reviewed.   Constitutional:       Appearance: Normal appearance.   HENT:      Head: Normocephalic.   Cardiovascular:      Rate and Rhythm: Normal rate and regular rhythm.      Pulses: Normal pulses.      Heart sounds: Normal heart sounds.   Pulmonary:      Effort: Pulmonary effort is normal. No respiratory distress.      Breath sounds: Wheezing and rhonchi present.   Abdominal:      General: Bowel sounds are normal.      Palpations: Abdomen is soft.   Musculoskeletal:         General: Normal range of motion.   Skin:     General: Skin is warm and dry.   Neurological:      Mental Status: He is alert and oriented to person, place, and time.   Psychiatric:         Behavior: Behavior normal.         Assessment:       Problem List Items Addressed This Visit        Pulmonary    Chronic obstructive pulmonary disease - Primary       Cardiac/Vascular    Hyperlipidemia    Relevant Orders    Comprehensive Metabolic Panel       GI    Gastroesophageal reflux disease    Relevant Orders     Comprehensive Metabolic Panel      Other Visit Diagnoses     Chronic midline low back pain without sciatica        Cough        Relevant Orders    X-Ray Chest PA And Lateral    Prediabetes        Relevant Orders    Hemoglobin A1C    Chest pain, unspecified type              Plan:        EKG - NSR, rate 64bpm, no acute changes. CXR negative for acute processes. If chest pain persists or worsens go to ER for evaluation. Will treat with Casandra Paz sample given, mucinex BID (600mg BID) - suspect this is pleuritic in nature. Currently on PPI.  Medication refills, stop spiriva and symbicort. Breztri 2 puffs BID for COPD, please notify clinic if you cannot get meds.     Schedule follow up in 3 months.

## 2022-08-23 RX ORDER — AZITHROMYCIN 250 MG/1
TABLET, FILM COATED ORAL
Qty: 6 TABLET | Refills: 0 | Status: SHIPPED | OUTPATIENT
Start: 2022-08-23 | End: 2022-08-28

## 2022-11-09 DIAGNOSIS — Z71.89 COMPLEX CARE COORDINATION: ICD-10-CM

## 2022-11-16 ENCOUNTER — OFFICE VISIT (OUTPATIENT)
Dept: FAMILY MEDICINE | Facility: CLINIC | Age: 57
End: 2022-11-16
Payer: MEDICARE

## 2022-11-16 VITALS
HEART RATE: 82 BPM | RESPIRATION RATE: 18 BRPM | WEIGHT: 223 LBS | SYSTOLIC BLOOD PRESSURE: 136 MMHG | OXYGEN SATURATION: 96 % | DIASTOLIC BLOOD PRESSURE: 86 MMHG | TEMPERATURE: 98 F | BODY MASS INDEX: 31.22 KG/M2 | HEIGHT: 71 IN

## 2022-11-16 DIAGNOSIS — R25.2 LEG CRAMPING: ICD-10-CM

## 2022-11-16 DIAGNOSIS — E78.5 HYPERLIPIDEMIA, UNSPECIFIED HYPERLIPIDEMIA TYPE: ICD-10-CM

## 2022-11-16 DIAGNOSIS — R53.83 FATIGUE, UNSPECIFIED TYPE: ICD-10-CM

## 2022-11-16 DIAGNOSIS — K21.9 GASTROESOPHAGEAL REFLUX DISEASE, UNSPECIFIED WHETHER ESOPHAGITIS PRESENT: ICD-10-CM

## 2022-11-16 DIAGNOSIS — F33.1 MODERATE EPISODE OF RECURRENT MAJOR DEPRESSIVE DISORDER: ICD-10-CM

## 2022-11-16 DIAGNOSIS — I73.9 CLAUDICATION OF BOTH LOWER EXTREMITIES: ICD-10-CM

## 2022-11-16 DIAGNOSIS — J44.9 CHRONIC OBSTRUCTIVE PULMONARY DISEASE, UNSPECIFIED COPD TYPE: Primary | ICD-10-CM

## 2022-11-16 LAB
ANION GAP SERPL CALCULATED.3IONS-SCNC: 12 MMOL/L (ref 7–16)
BUN SERPL-MCNC: 8 MG/DL (ref 7–18)
BUN/CREAT SERPL: 8 (ref 6–20)
CALCIUM SERPL-MCNC: 9.4 MG/DL (ref 8.5–10.1)
CHLORIDE SERPL-SCNC: 102 MMOL/L (ref 98–107)
CHOLEST SERPL-MCNC: 184 MG/DL (ref 0–200)
CHOLEST/HDLC SERPL: 3.8 {RATIO}
CO2 SERPL-SCNC: 28 MMOL/L (ref 21–32)
CREAT SERPL-MCNC: 0.95 MG/DL (ref 0.7–1.3)
EGFR (NO RACE VARIABLE) (RUSH/TITUS): 92 ML/MIN/1.73M²
GLUCOSE SERPL-MCNC: 68 MG/DL (ref 74–106)
HDLC SERPL-MCNC: 48 MG/DL (ref 40–60)
LDLC SERPL CALC-MCNC: 120 MG/DL
LDLC/HDLC SERPL: 2.5 {RATIO}
MAGNESIUM SERPL-MCNC: 2.3 MG/DL (ref 1.7–2.3)
NONHDLC SERPL-MCNC: 136 MG/DL
POTASSIUM SERPL-SCNC: 5.1 MMOL/L (ref 3.5–5.1)
SODIUM SERPL-SCNC: 137 MMOL/L (ref 136–145)
TRIGL SERPL-MCNC: 78 MG/DL (ref 35–150)
VLDLC SERPL-MCNC: 16 MG/DL

## 2022-11-16 PROCEDURE — 83735 ASSAY OF MAGNESIUM: CPT | Mod: ,,, | Performed by: CLINICAL MEDICAL LABORATORY

## 2022-11-16 PROCEDURE — 1159F MED LIST DOCD IN RCRD: CPT | Mod: ,,, | Performed by: NURSE PRACTITIONER

## 2022-11-16 PROCEDURE — 1160F RVW MEDS BY RX/DR IN RCRD: CPT | Mod: ,,, | Performed by: NURSE PRACTITIONER

## 2022-11-16 PROCEDURE — 3044F HG A1C LEVEL LT 7.0%: CPT | Mod: ,,, | Performed by: NURSE PRACTITIONER

## 2022-11-16 PROCEDURE — 80061 LIPID PANEL: CPT | Mod: ,,, | Performed by: CLINICAL MEDICAL LABORATORY

## 2022-11-16 PROCEDURE — 3044F PR MOST RECENT HEMOGLOBIN A1C LEVEL <7.0%: ICD-10-PCS | Mod: ,,, | Performed by: NURSE PRACTITIONER

## 2022-11-16 PROCEDURE — 3079F PR MOST RECENT DIASTOLIC BLOOD PRESSURE 80-89 MM HG: ICD-10-PCS | Mod: ,,, | Performed by: NURSE PRACTITIONER

## 2022-11-16 PROCEDURE — 3008F PR BODY MASS INDEX (BMI) DOCUMENTED: ICD-10-PCS | Mod: ,,, | Performed by: NURSE PRACTITIONER

## 2022-11-16 PROCEDURE — 99213 PR OFFICE/OUTPT VISIT, EST, LEVL III, 20-29 MIN: ICD-10-PCS | Mod: ,,, | Performed by: NURSE PRACTITIONER

## 2022-11-16 PROCEDURE — 1159F PR MEDICATION LIST DOCUMENTED IN MEDICAL RECORD: ICD-10-PCS | Mod: ,,, | Performed by: NURSE PRACTITIONER

## 2022-11-16 PROCEDURE — 83735 MAGNESIUM: ICD-10-PCS | Mod: ,,, | Performed by: CLINICAL MEDICAL LABORATORY

## 2022-11-16 PROCEDURE — 80061 LIPID PANEL: ICD-10-PCS | Mod: ,,, | Performed by: CLINICAL MEDICAL LABORATORY

## 2022-11-16 PROCEDURE — 80048 BASIC METABOLIC PANEL: ICD-10-PCS | Mod: ,,, | Performed by: CLINICAL MEDICAL LABORATORY

## 2022-11-16 PROCEDURE — 3079F DIAST BP 80-89 MM HG: CPT | Mod: ,,, | Performed by: NURSE PRACTITIONER

## 2022-11-16 PROCEDURE — 1160F PR REVIEW ALL MEDS BY PRESCRIBER/CLIN PHARMACIST DOCUMENTED: ICD-10-PCS | Mod: ,,, | Performed by: NURSE PRACTITIONER

## 2022-11-16 PROCEDURE — 3008F BODY MASS INDEX DOCD: CPT | Mod: ,,, | Performed by: NURSE PRACTITIONER

## 2022-11-16 PROCEDURE — 3075F SYST BP GE 130 - 139MM HG: CPT | Mod: ,,, | Performed by: NURSE PRACTITIONER

## 2022-11-16 PROCEDURE — 99213 OFFICE O/P EST LOW 20 MIN: CPT | Mod: ,,, | Performed by: NURSE PRACTITIONER

## 2022-11-16 PROCEDURE — 80048 BASIC METABOLIC PNL TOTAL CA: CPT | Mod: ,,, | Performed by: CLINICAL MEDICAL LABORATORY

## 2022-11-16 PROCEDURE — 3075F PR MOST RECENT SYSTOLIC BLOOD PRESS GE 130-139MM HG: ICD-10-PCS | Mod: ,,, | Performed by: NURSE PRACTITIONER

## 2022-11-16 RX ORDER — BUDESONIDE, GLYCOPYRROLATE, AND FORMOTEROL FUMARATE 160; 9; 4.8 UG/1; UG/1; UG/1
2 AEROSOL, METERED RESPIRATORY (INHALATION) 2 TIMES DAILY
Qty: 10.7 G | Refills: 2 | Status: SHIPPED | OUTPATIENT
Start: 2022-11-16 | End: 2023-01-12 | Stop reason: SDUPTHER

## 2022-11-16 RX ORDER — PANTOPRAZOLE SODIUM 40 MG/1
40 TABLET, DELAYED RELEASE ORAL DAILY
Qty: 90 TABLET | Refills: 1 | Status: SHIPPED | OUTPATIENT
Start: 2022-11-16 | End: 2023-01-11 | Stop reason: SDUPTHER

## 2022-11-16 RX ORDER — ESCITALOPRAM OXALATE 5 MG/1
5 TABLET ORAL DAILY
Qty: 30 TABLET | Refills: 11 | Status: SHIPPED | OUTPATIENT
Start: 2022-11-16 | End: 2023-01-12

## 2022-11-16 RX ORDER — GABAPENTIN 300 MG/1
600 CAPSULE ORAL 2 TIMES DAILY
Qty: 360 CAPSULE | Refills: 1 | Status: SHIPPED | OUTPATIENT
Start: 2022-11-16 | End: 2023-01-12 | Stop reason: SDUPTHER

## 2022-11-16 RX ORDER — MELOXICAM 7.5 MG/1
7.5 TABLET ORAL DAILY
Qty: 60 TABLET | Refills: 1 | Status: SHIPPED | OUTPATIENT
Start: 2022-11-16 | End: 2023-01-12 | Stop reason: SDUPTHER

## 2022-11-16 RX ORDER — ATORVASTATIN CALCIUM 40 MG/1
40 TABLET, FILM COATED ORAL DAILY
Qty: 90 TABLET | Refills: 1 | Status: SHIPPED | OUTPATIENT
Start: 2022-11-16 | End: 2023-02-22 | Stop reason: HOSPADM

## 2022-11-16 NOTE — PROGRESS NOTES
Subjective:       Patient ID: Andrez Wen is a 57 y.o. male.    Chief Complaint: Follow-up (Pt is fasting)    Mr. Wen presents to clinic in follow up, he complains of anxiety and depression, states there are days that he stays in bed due to not being able to deal with others, loss of interest in activities, feels that he has a quick temper. He notes a history of depression and anxiety, interested in trying medication. He continues to smoke, breztri inhaler has improved his dyspnea on exertion, he notes a productive cough in the mornings. He reports that he no longer has motivation to work or complete daily tasks such as house cleaning etc. He states that he snores and awakens feeling tired and unrefreshed after sleeping through the night, plan for sleep referral.     He also has a family h/o CAD, reports his brother and father both had cardiac stents in the past. We will refer to cardiology for evaluation of fatigue, dsypnea. He reports cramping to hands and lower extremities, pain in his thighs and hips with walking, improves with rest.    Follow-up  Associated symptoms include coughing and fatigue. Pertinent negatives include no chest pain.   Review of Systems   Constitutional:  Positive for fatigue. Negative for unexpected weight change.   Respiratory:  Positive for cough and shortness of breath.    Cardiovascular:  Positive for claudication. Negative for chest pain.   Gastrointestinal: Negative.    Genitourinary: Negative.    Neurological: Negative.    Psychiatric/Behavioral:  Positive for dysphoric mood and sleep disturbance. The patient is nervous/anxious.        Objective:      Physical Exam  Vitals reviewed.   Constitutional:       Appearance: Normal appearance.   HENT:      Head: Normocephalic.   Cardiovascular:      Rate and Rhythm: Normal rate and regular rhythm.      Pulses: Normal pulses.      Heart sounds: Normal heart sounds.   Pulmonary:      Effort: Pulmonary effort is normal. No respiratory  distress.      Breath sounds: Normal breath sounds. No wheezing.   Abdominal:      Palpations: Abdomen is soft.   Musculoskeletal:         General: Normal range of motion.   Skin:     General: Skin is warm and dry.   Neurological:      Mental Status: He is alert and oriented to person, place, and time.   Psychiatric:         Behavior: Behavior normal.       Assessment:       Problem List Items Addressed This Visit          Pulmonary    Chronic obstructive pulmonary disease       Cardiac/Vascular    Hyperlipidemia - Primary    Relevant Orders    Lipid Panel       GI    Gastroesophageal reflux disease     Other Visit Diagnoses       Fatigue, unspecified type        Relevant Orders    Ambulatory referral/consult to Cardiology    Ambulatory referral/consult to Sleep Disorders    Leg cramping        Relevant Orders    Basic Metabolic Panel    Magnesium    US Lower Extrem Arteries Bilat with JONY (xpd)    Claudication of both lower extremities        Relevant Orders    US Lower Extrem Arteries Bilat with JONY (xpd)    Moderate episode of recurrent major depressive disorder                  Plan:        Sleep Med referral.   Cardiology referral.   BLE VAS with JONY.   Fasting labs today, medication refills.

## 2022-11-21 ENCOUNTER — TELEPHONE (OUTPATIENT)
Dept: FAMILY MEDICINE | Facility: CLINIC | Age: 57
End: 2022-11-21
Payer: MEDICAID

## 2022-11-21 NOTE — TELEPHONE ENCOUNTER
----- Message from KATRIN Ferreira sent at 11/17/2022  8:17 AM CST -----  LDL is worse at 120, he is losing weight and changing diet - recommend repeating in 3 months, may need to increase lipitor if no improvement.

## 2022-11-23 ENCOUNTER — HOSPITAL ENCOUNTER (OUTPATIENT)
Dept: RADIOLOGY | Facility: HOSPITAL | Age: 57
Discharge: HOME | End: 2022-11-23
Attending: NURSE PRACTITIONER
Payer: MEDICARE

## 2022-11-23 DIAGNOSIS — R25.2 LEG CRAMPING: ICD-10-CM

## 2022-11-23 DIAGNOSIS — I73.9 CLAUDICATION OF BOTH LOWER EXTREMITIES: ICD-10-CM

## 2022-11-23 PROCEDURE — 93925 LOWER EXTREMITY STUDY: CPT | Mod: TC

## 2022-11-23 PROCEDURE — 93922 US ARTERIAL LOWER EXTREMITY BILAT WITH ABI (XPD): ICD-10-PCS | Mod: 26,,, | Performed by: RADIOLOGY

## 2022-11-23 PROCEDURE — 93922 UPR/L XTREMITY ART 2 LEVELS: CPT | Mod: 26,,, | Performed by: RADIOLOGY

## 2022-11-23 PROCEDURE — 93925 LOWER EXTREMITY STUDY: CPT | Mod: 26,,, | Performed by: RADIOLOGY

## 2022-11-23 PROCEDURE — 93925 US ARTERIAL LOWER EXTREMITY BILAT WITH ABI (XPD): ICD-10-PCS | Mod: 26,,, | Performed by: RADIOLOGY

## 2022-12-15 ENCOUNTER — OFFICE VISIT (OUTPATIENT)
Dept: CARDIOLOGY | Facility: CLINIC | Age: 57
End: 2022-12-15
Payer: MEDICARE

## 2022-12-15 VITALS
SYSTOLIC BLOOD PRESSURE: 166 MMHG | WEIGHT: 230 LBS | BODY MASS INDEX: 32.2 KG/M2 | OXYGEN SATURATION: 97 % | HEIGHT: 71 IN | DIASTOLIC BLOOD PRESSURE: 88 MMHG | HEART RATE: 76 BPM

## 2022-12-15 DIAGNOSIS — Z72.0 TOBACCO ABUSE DISORDER: ICD-10-CM

## 2022-12-15 DIAGNOSIS — E66.01 MORBID OBESITY: ICD-10-CM

## 2022-12-15 DIAGNOSIS — E78.5 HYPERLIPIDEMIA, UNSPECIFIED HYPERLIPIDEMIA TYPE: ICD-10-CM

## 2022-12-15 DIAGNOSIS — R53.83 FATIGUE, UNSPECIFIED TYPE: ICD-10-CM

## 2022-12-15 DIAGNOSIS — E78.2 MIXED HYPERLIPIDEMIA: ICD-10-CM

## 2022-12-15 DIAGNOSIS — R07.9 CHEST PAIN, UNSPECIFIED TYPE: Primary | ICD-10-CM

## 2022-12-15 DIAGNOSIS — R00.2 INTERMITTENT PALPITATIONS: ICD-10-CM

## 2022-12-15 DIAGNOSIS — I49.9 IRREGULAR HEART BEAT: ICD-10-CM

## 2022-12-15 DIAGNOSIS — R06.09 EXERTIONAL DYSPNEA: ICD-10-CM

## 2022-12-15 DIAGNOSIS — G47.33 OBSTRUCTIVE SLEEP APNEA OF ADULT: ICD-10-CM

## 2022-12-15 DIAGNOSIS — I50.9 CONGESTIVE HEART FAILURE, UNSPECIFIED HF CHRONICITY, UNSPECIFIED HEART FAILURE TYPE: ICD-10-CM

## 2022-12-15 PROCEDURE — 1159F MED LIST DOCD IN RCRD: CPT | Mod: CPTII,,, | Performed by: INTERNAL MEDICINE

## 2022-12-15 PROCEDURE — 3008F PR BODY MASS INDEX (BMI) DOCUMENTED: ICD-10-PCS | Mod: CPTII,,, | Performed by: INTERNAL MEDICINE

## 2022-12-15 PROCEDURE — 3077F PR MOST RECENT SYSTOLIC BLOOD PRESSURE >= 140 MM HG: ICD-10-PCS | Mod: CPTII,,, | Performed by: INTERNAL MEDICINE

## 2022-12-15 PROCEDURE — 3079F DIAST BP 80-89 MM HG: CPT | Mod: CPTII,,, | Performed by: INTERNAL MEDICINE

## 2022-12-15 PROCEDURE — 1159F PR MEDICATION LIST DOCUMENTED IN MEDICAL RECORD: ICD-10-PCS | Mod: CPTII,,, | Performed by: INTERNAL MEDICINE

## 2022-12-15 PROCEDURE — 3079F PR MOST RECENT DIASTOLIC BLOOD PRESSURE 80-89 MM HG: ICD-10-PCS | Mod: CPTII,,, | Performed by: INTERNAL MEDICINE

## 2022-12-15 PROCEDURE — 93010 ELECTROCARDIOGRAM REPORT: CPT | Mod: S$PBB,,, | Performed by: INTERNAL MEDICINE

## 2022-12-15 PROCEDURE — 3077F SYST BP >= 140 MM HG: CPT | Mod: CPTII,,, | Performed by: INTERNAL MEDICINE

## 2022-12-15 PROCEDURE — 93010 EKG 12-LEAD: ICD-10-PCS | Mod: S$PBB,,, | Performed by: INTERNAL MEDICINE

## 2022-12-15 PROCEDURE — 1160F RVW MEDS BY RX/DR IN RCRD: CPT | Mod: CPTII,,, | Performed by: INTERNAL MEDICINE

## 2022-12-15 PROCEDURE — 3008F BODY MASS INDEX DOCD: CPT | Mod: CPTII,,, | Performed by: INTERNAL MEDICINE

## 2022-12-15 PROCEDURE — 93005 ELECTROCARDIOGRAM TRACING: CPT | Mod: PBBFAC,59 | Performed by: INTERNAL MEDICINE

## 2022-12-15 PROCEDURE — 99205 OFFICE O/P NEW HI 60 MIN: CPT | Mod: S$PBB,,, | Performed by: INTERNAL MEDICINE

## 2022-12-15 PROCEDURE — 1160F PR REVIEW ALL MEDS BY PRESCRIBER/CLIN PHARMACIST DOCUMENTED: ICD-10-PCS | Mod: CPTII,,, | Performed by: INTERNAL MEDICINE

## 2022-12-15 PROCEDURE — 93246 EXT ECG>7D<15D RECORDING: CPT | Performed by: INTERNAL MEDICINE

## 2022-12-15 PROCEDURE — 99205 PR OFFICE/OUTPT VISIT, NEW, LEVL V, 60-74 MIN: ICD-10-PCS | Mod: S$PBB,,, | Performed by: INTERNAL MEDICINE

## 2022-12-15 PROCEDURE — 99214 OFFICE O/P EST MOD 30 MIN: CPT | Mod: PBBFAC | Performed by: INTERNAL MEDICINE

## 2022-12-15 RX ORDER — LOSARTAN POTASSIUM 50 MG/1
50 TABLET ORAL DAILY
Qty: 90 TABLET | Refills: 3 | Status: CANCELLED | OUTPATIENT
Start: 2022-12-15 | End: 2023-12-15

## 2023-01-02 PROBLEM — Z72.0 TOBACCO ABUSE DISORDER: Status: ACTIVE | Noted: 2023-01-02

## 2023-01-02 PROBLEM — I50.9 CONGESTIVE HEART FAILURE: Status: ACTIVE | Noted: 2023-01-02

## 2023-01-02 PROBLEM — I49.9 IRREGULAR HEART BEAT: Status: ACTIVE | Noted: 2023-01-02

## 2023-01-02 PROBLEM — R00.2 INTERMITTENT PALPITATIONS: Status: ACTIVE | Noted: 2023-01-02

## 2023-01-02 PROBLEM — G47.33 OBSTRUCTIVE SLEEP APNEA OF ADULT: Status: ACTIVE | Noted: 2023-01-02

## 2023-01-02 PROBLEM — E66.01 MORBID OBESITY: Status: ACTIVE | Noted: 2023-01-02

## 2023-01-02 PROBLEM — R06.09 EXERTIONAL DYSPNEA: Status: ACTIVE | Noted: 2023-01-02

## 2023-01-02 PROBLEM — R53.83 FATIGUE: Status: ACTIVE | Noted: 2023-01-02

## 2023-01-02 NOTE — PROGRESS NOTES
Cardiology Clinic Note:    PCP: KATRIN Ferreira    REFERRING PHYSICIAN:   KATRIN Ferreira  CHIEF COMPLAINT: Shortness of breath with exertion    HISTORY OF PRESENT ILLNESS:  Andrez Wen is a 57 y.o. male who presents for evaluation of shortness of breath with exertion.  Pt reports worsening of shortness of breath with exertion over last several months.  He notes is able to walk 20 to 25 feet before he has to stop to catch his breath.  No chest pain associated with dyspnea, however feels his chest tightens up, such that he cannot take a deep breath.  No nausea or diaphoresis with dyspnea.  He reports he was able to walk at least twice that distance one month ago.  He denies previous stress test or previous MI.  He also reports has frequent palpitations, feels heart race and skip, comes and goes spontaneously, lasts from several seconds to several minutes, not associated with chest pain, pressure or  shortness o      Review of Systems:  Review of Systems   Constitutional: Positive for malaise/fatigue. Negative for diaphoresis, night sweats and weight gain.   HENT:  Negative for congestion, ear pain, hearing loss, nosebleeds and sore throat.    Eyes:  Negative for blurred vision, double vision, pain, photophobia and visual disturbance.   Cardiovascular:  Positive for claudication, dyspnea on exertion, irregular heartbeat, leg swelling, near-syncope and palpitations. Negative for chest pain, orthopnea and syncope.   Respiratory:  Positive for shortness of breath, sleep disturbances due to breathing and snoring. Negative for cough and wheezing.    Endocrine: Negative for cold intolerance, heat intolerance, polydipsia, polyphagia and polyuria.   Hematologic/Lymphatic: Negative for bleeding problem. Does not bruise/bleed easily.   Skin:  Negative for dry skin, flushing, itching, rash and skin cancer.   Musculoskeletal:  Positive for back pain. Negative for arthritis, falls, joint pain, muscle cramps, muscle  weakness and myalgias.   Gastrointestinal:  Negative for abdominal pain, change in bowel habit, constipation, diarrhea, dysphagia, heartburn, nausea and vomiting.   Genitourinary:  Negative for bladder incontinence, dysuria, flank pain, frequency and nocturia.   Neurological:  Negative for dizziness, focal weakness, headaches, light-headedness, loss of balance, numbness, paresthesias and seizures.   Psychiatric/Behavioral:  Negative for depression, memory loss and substance abuse. The patient is nervous/anxious.    Allergic/Immunologic: Negative for environmental allergies.        PAST MEDICAL HISTORY:  Past Medical History:   Diagnosis Date    Chronic heart failure     COPD (chronic obstructive pulmonary disease)        PAST SURGICAL HISTORY:  History reviewed. No pertinent surgical history.    SOCIAL HISTORY:  Social History     Socioeconomic History    Marital status:    Tobacco Use    Smoking status: Every Day    Smokeless tobacco: Never    Tobacco comments:     Patient has tried quitting.    Substance and Sexual Activity    Alcohol use: Yes     Comment: vodka or crown sometimes.    Drug use: Yes     Frequency: 7.0 times per week     Types: Marijuana     Comment: tries not to    Sexual activity: Not Currently     Partners: Female       FAMILY HISTORY:  Family History   Family history unknown: Yes       ALLERGIES:  Allergies as of 12/15/2022    (No Known Allergies)         MEDICATIONS:  Current Outpatient Medications on File Prior to Visit   Medication Sig Dispense Refill    albuterol (PROVENTIL) 2.5 mg /3 mL (0.083 %) nebulizer solution Take 3 mLs (2.5 mg total) by nebulization 2 (two) times a day. 3 mL 5    albuterol (PROVENTIL/VENTOLIN HFA) 90 mcg/actuation inhaler Proventil HFA 90 mcg/actuation aerosol inhaler  Inhale 2 puffs every 4 hours by inhalation route. 18 g 2    atorvastatin (LIPITOR) 40 MG tablet Take 1 tablet (40 mg total) by mouth once daily. 90 tablet 1    budesonide-glycopyr-formoterol  "(ALEX AEROSPHERE) 160-9-4.8 mcg/actuation HFAA Inhale 2 puffs into the lungs 2 (two) times daily. 10.7 g 2    cyclobenzaprine (FLEXERIL) 10 MG tablet Take 1 tablet (10 mg total) by mouth 3 (three) times daily as needed for Muscle spasms. 90 tablet 2    EScitalopram oxalate (LEXAPRO) 5 MG Tab Take 1 tablet (5 mg total) by mouth once daily. 30 tablet 11    gabapentin (NEURONTIN) 300 MG capsule Take 2 capsules (600 mg total) by mouth 2 (two) times daily. 360 capsule 1    meloxicam (MOBIC) 7.5 MG tablet Take 1 tablet (7.5 mg total) by mouth once daily. 60 tablet 1    pantoprazole (PROTONIX) 40 MG tablet Take 1 tablet (40 mg total) by mouth once daily. 90 tablet 1     No current facility-administered medications on file prior to visit.          PHYSICAL EXAM:  Blood pressure (!) 166/88, pulse 76, height 5' 11" (1.803 m), weight 104.3 kg (230 lb), SpO2 97 %.  Wt Readings from Last 3 Encounters:   12/15/22 104.3 kg (230 lb)   11/16/22 101.2 kg (223 lb)   08/22/22 99.8 kg (220 lb)      Body mass index is 32.08 kg/m².    Physical Exam  Vitals and nursing note reviewed.   Constitutional:       Appearance: Normal appearance. He is obese.   HENT:      Head: Normocephalic and atraumatic.      Right Ear: External ear normal.      Left Ear: External ear normal.   Eyes:      General: No scleral icterus.        Right eye: No discharge.         Left eye: No discharge.      Extraocular Movements: Extraocular movements intact.      Conjunctiva/sclera: Conjunctivae normal.      Pupils: Pupils are equal, round, and reactive to light.   Cardiovascular:      Rate and Rhythm: Normal rate and regular rhythm.      Pulses: Normal pulses.      Heart sounds: Normal heart sounds. No murmur heard.    No friction rub. No gallop.   Pulmonary:      Effort: Pulmonary effort is normal.      Breath sounds: Wheezing present. No rhonchi or rales.   Chest:      Chest wall: No tenderness.   Abdominal:      General: Abdomen is flat. Bowel sounds are " normal. There is no distension.      Palpations: Abdomen is soft.      Tenderness: There is abdominal tenderness. There is no guarding or rebound.   Musculoskeletal:         General: Swelling present. No tenderness. Normal range of motion.      Cervical back: Normal range of motion and neck supple.      Right lower leg: Edema present.      Left lower leg: Edema present.   Skin:     General: Skin is warm and dry.      Findings: No erythema or rash.   Neurological:      General: No focal deficit present.      Mental Status: He is alert and oriented to person, place, and time.      Cranial Nerves: No cranial nerve deficit.      Motor: No weakness.      Gait: Gait normal.   Psychiatric:         Mood and Affect: Mood normal.         Behavior: Behavior normal.         Thought Content: Thought content normal.         Judgment: Judgment normal.        LABS REVIEWED:  Lab Results   Component Value Date    WBC 13.05 (H) 03/10/2022    RBC 5.86 03/10/2022    HGB 16.6 03/10/2022    HCT 51.8 03/10/2022    MCV 88.4 03/10/2022    MCH 28.3 03/10/2022    MCHC 32.0 03/10/2022    RDW 14.0 03/10/2022     03/10/2022    MPV 11.5 03/10/2022    NRBC 0.0 03/10/2022     Lab Results   Component Value Date     11/16/2022    K 5.1 11/16/2022     11/16/2022    CO2 28 11/16/2022    BUN 8 11/16/2022    MG 2.3 11/16/2022     Lab Results   Component Value Date    AST 26 08/22/2022    ALT 34 08/22/2022     Lab Results   Component Value Date    GLU 68 (L) 11/16/2022    HGBA1C 5.7 08/22/2022     Lab Results   Component Value Date    CHOL 184 11/16/2022    HDL 48 11/16/2022    TRIG 78 11/16/2022    CHOLHDL 3.8 11/16/2022       CARDIAC STUDIES REVIEWED:    OTHER IMAGING STUDIES REVIEWED:        ASSESSMENT: PLAN:  1. Shortness of breath with exertion, concerning for anginal equivalent, will order Lexiscan cardiolyte stress imaging to evaluate for ischemic substrate, add ASA 81 mg q d  2. Hypertension, not well controlled, add Diltiazmem  CD 240m mg q d  3. Bilateral lower extremity edema, unclear history of Chf, type unclear, will order echo to evaluate for LV function  4. Back and hip pain, limiting  activity  5. Tobacco abuse, discussed smoking cessation  6. Morbid obesity, discussed lifestyle changes  7. Palpitations: order outpatient telemetry with Natasha

## 2023-01-06 PROCEDURE — 93248 PR EXT ECG, CONT, > 7 DAYS <= 15 DAYS, REVIEW W/INT: ICD-10-PCS | Mod: ,,, | Performed by: INTERNAL MEDICINE

## 2023-01-06 PROCEDURE — 93248 EXT ECG>7D<15D REV&INTERPJ: CPT | Mod: ,,, | Performed by: INTERNAL MEDICINE

## 2023-01-10 DIAGNOSIS — G47.30 SLEEP APNEA, UNSPECIFIED: Primary | ICD-10-CM

## 2023-01-11 RX ORDER — PANTOPRAZOLE SODIUM 40 MG/1
40 TABLET, DELAYED RELEASE ORAL DAILY
Qty: 90 TABLET | Refills: 1 | Status: SHIPPED | OUTPATIENT
Start: 2023-01-11 | End: 2023-07-05 | Stop reason: SDUPTHER

## 2023-01-11 RX ORDER — CYCLOBENZAPRINE HCL 10 MG
10 TABLET ORAL 3 TIMES DAILY PRN
Qty: 90 TABLET | Refills: 2 | Status: CANCELLED | OUTPATIENT
Start: 2023-01-11

## 2023-01-12 ENCOUNTER — OFFICE VISIT (OUTPATIENT)
Dept: FAMILY MEDICINE | Facility: CLINIC | Age: 58
End: 2023-01-12
Payer: MEDICARE

## 2023-01-12 VITALS
WEIGHT: 226 LBS | HEART RATE: 74 BPM | RESPIRATION RATE: 20 BRPM | BODY MASS INDEX: 31.64 KG/M2 | SYSTOLIC BLOOD PRESSURE: 134 MMHG | TEMPERATURE: 98 F | HEIGHT: 71 IN | OXYGEN SATURATION: 97 % | DIASTOLIC BLOOD PRESSURE: 88 MMHG

## 2023-01-12 DIAGNOSIS — R73.03 PREDIABETES: ICD-10-CM

## 2023-01-12 DIAGNOSIS — L85.8 KERATOSIS PILARIS: ICD-10-CM

## 2023-01-12 DIAGNOSIS — M54.50 CHRONIC BILATERAL LOW BACK PAIN WITHOUT SCIATICA: ICD-10-CM

## 2023-01-12 DIAGNOSIS — Z72.0 TOBACCO ABUSE DISORDER: ICD-10-CM

## 2023-01-12 DIAGNOSIS — G47.33 OBSTRUCTIVE SLEEP APNEA OF ADULT: ICD-10-CM

## 2023-01-12 DIAGNOSIS — E78.2 MIXED HYPERLIPIDEMIA: ICD-10-CM

## 2023-01-12 DIAGNOSIS — G89.29 CHRONIC BILATERAL LOW BACK PAIN WITHOUT SCIATICA: ICD-10-CM

## 2023-01-12 DIAGNOSIS — K21.9 GASTROESOPHAGEAL REFLUX DISEASE, UNSPECIFIED WHETHER ESOPHAGITIS PRESENT: ICD-10-CM

## 2023-01-12 DIAGNOSIS — F41.9 ANXIETY: Primary | ICD-10-CM

## 2023-01-12 PROCEDURE — 1160F PR REVIEW ALL MEDS BY PRESCRIBER/CLIN PHARMACIST DOCUMENTED: ICD-10-PCS | Mod: ,,, | Performed by: NURSE PRACTITIONER

## 2023-01-12 PROCEDURE — 3075F PR MOST RECENT SYSTOLIC BLOOD PRESS GE 130-139MM HG: ICD-10-PCS | Mod: ,,, | Performed by: NURSE PRACTITIONER

## 2023-01-12 PROCEDURE — 3079F DIAST BP 80-89 MM HG: CPT | Mod: ,,, | Performed by: NURSE PRACTITIONER

## 2023-01-12 PROCEDURE — 3008F PR BODY MASS INDEX (BMI) DOCUMENTED: ICD-10-PCS | Mod: ,,, | Performed by: NURSE PRACTITIONER

## 2023-01-12 PROCEDURE — 3075F SYST BP GE 130 - 139MM HG: CPT | Mod: ,,, | Performed by: NURSE PRACTITIONER

## 2023-01-12 PROCEDURE — 3008F BODY MASS INDEX DOCD: CPT | Mod: ,,, | Performed by: NURSE PRACTITIONER

## 2023-01-12 PROCEDURE — 1159F PR MEDICATION LIST DOCUMENTED IN MEDICAL RECORD: ICD-10-PCS | Mod: ,,, | Performed by: NURSE PRACTITIONER

## 2023-01-12 PROCEDURE — 99213 PR OFFICE/OUTPT VISIT, EST, LEVL III, 20-29 MIN: ICD-10-PCS | Mod: ,,, | Performed by: NURSE PRACTITIONER

## 2023-01-12 PROCEDURE — 99213 OFFICE O/P EST LOW 20 MIN: CPT | Mod: ,,, | Performed by: NURSE PRACTITIONER

## 2023-01-12 PROCEDURE — 3079F PR MOST RECENT DIASTOLIC BLOOD PRESSURE 80-89 MM HG: ICD-10-PCS | Mod: ,,, | Performed by: NURSE PRACTITIONER

## 2023-01-12 PROCEDURE — 1159F MED LIST DOCD IN RCRD: CPT | Mod: ,,, | Performed by: NURSE PRACTITIONER

## 2023-01-12 PROCEDURE — 1160F RVW MEDS BY RX/DR IN RCRD: CPT | Mod: ,,, | Performed by: NURSE PRACTITIONER

## 2023-01-12 RX ORDER — MELOXICAM 7.5 MG/1
7.5 TABLET ORAL DAILY
Qty: 60 TABLET | Refills: 1 | Status: SHIPPED | OUTPATIENT
Start: 2023-01-12 | End: 2023-02-20

## 2023-01-12 RX ORDER — ALBUTEROL SULFATE 90 UG/1
AEROSOL, METERED RESPIRATORY (INHALATION)
Qty: 18 G | Refills: 5 | Status: SHIPPED | OUTPATIENT
Start: 2023-01-12 | End: 2023-07-05 | Stop reason: SDUPTHER

## 2023-01-12 RX ORDER — ESCITALOPRAM OXALATE 10 MG/1
10 TABLET ORAL DAILY
Qty: 90 TABLET | Refills: 1 | Status: SHIPPED | OUTPATIENT
Start: 2023-01-12 | End: 2023-04-12

## 2023-01-12 RX ORDER — BUDESONIDE, GLYCOPYRROLATE, AND FORMOTEROL FUMARATE 160; 9; 4.8 UG/1; UG/1; UG/1
2 AEROSOL, METERED RESPIRATORY (INHALATION) 2 TIMES DAILY
Qty: 10.7 G | Refills: 5 | Status: SHIPPED | OUTPATIENT
Start: 2023-01-12 | End: 2023-07-05 | Stop reason: SDUPTHER

## 2023-01-12 RX ORDER — ALBUTEROL SULFATE 0.83 MG/ML
2.5 SOLUTION RESPIRATORY (INHALATION) 2 TIMES DAILY
Qty: 3 ML | Refills: 5 | Status: SHIPPED | OUTPATIENT
Start: 2023-01-12 | End: 2023-07-05 | Stop reason: SDUPTHER

## 2023-01-12 RX ORDER — TIZANIDINE 4 MG/1
4 TABLET ORAL EVERY 8 HOURS
Qty: 180 TABLET | Refills: 1 | Status: SHIPPED | OUTPATIENT
Start: 2023-01-12 | End: 2023-01-22

## 2023-01-12 RX ORDER — AMMONIUM LACTATE 12 G/100G
LOTION TOPICAL 2 TIMES DAILY
Qty: 396 G | Refills: 2 | Status: SHIPPED | OUTPATIENT
Start: 2023-01-12 | End: 2023-04-12

## 2023-01-12 RX ORDER — ROSUVASTATIN CALCIUM 20 MG/1
20 TABLET, COATED ORAL NIGHTLY
Qty: 90 TABLET | Refills: 3 | Status: SHIPPED | OUTPATIENT
Start: 2023-01-12 | End: 2023-04-12 | Stop reason: SDUPTHER

## 2023-01-12 RX ORDER — GABAPENTIN 300 MG/1
600 CAPSULE ORAL 2 TIMES DAILY
Qty: 360 CAPSULE | Refills: 1 | Status: SHIPPED | OUTPATIENT
Start: 2023-01-12 | End: 2023-04-12 | Stop reason: SDUPTHER

## 2023-01-23 DIAGNOSIS — G47.30 SLEEP APNEA, UNSPECIFIED: Primary | ICD-10-CM

## 2023-01-25 ENCOUNTER — PROCEDURE VISIT (OUTPATIENT)
Dept: SLEEP MEDICINE | Facility: HOSPITAL | Age: 58
End: 2023-01-25
Attending: INTERNAL MEDICINE
Payer: MEDICAID

## 2023-01-25 DIAGNOSIS — G47.33 OBSTRUCTIVE SLEEP APNEA (ADULT) (PEDIATRIC): Primary | ICD-10-CM

## 2023-01-25 DIAGNOSIS — G47.30 SLEEP APNEA, UNSPECIFIED: ICD-10-CM

## 2023-01-25 PROCEDURE — G0399 HOME SLEEP TEST/TYPE 3 PORTA: HCPCS | Mod: PO

## 2023-02-17 ENCOUNTER — TELEPHONE (OUTPATIENT)
Dept: FAMILY MEDICINE | Facility: CLINIC | Age: 58
End: 2023-02-17
Payer: MEDICARE

## 2023-02-17 NOTE — TELEPHONE ENCOUNTER
----- Message from Helen Tilley sent at 2/17/2023  2:09 PM CST -----  Pt. Would like a call back about his medications and med refills.         Thank you have a blessed day!!

## 2023-04-11 ENCOUNTER — PATIENT OUTREACH (OUTPATIENT)
Dept: ADMINISTRATIVE | Facility: HOSPITAL | Age: 58
End: 2023-04-11

## 2023-04-12 ENCOUNTER — APPOINTMENT (OUTPATIENT)
Dept: RADIOLOGY | Facility: CLINIC | Age: 58
End: 2023-04-12
Attending: NURSE PRACTITIONER
Payer: MEDICARE

## 2023-04-12 ENCOUNTER — OFFICE VISIT (OUTPATIENT)
Dept: FAMILY MEDICINE | Facility: CLINIC | Age: 58
End: 2023-04-12
Payer: MEDICARE

## 2023-04-12 VITALS
RESPIRATION RATE: 18 BRPM | TEMPERATURE: 98 F | HEIGHT: 71 IN | DIASTOLIC BLOOD PRESSURE: 88 MMHG | SYSTOLIC BLOOD PRESSURE: 130 MMHG | HEART RATE: 60 BPM | WEIGHT: 245 LBS | OXYGEN SATURATION: 95 % | BODY MASS INDEX: 34.3 KG/M2

## 2023-04-12 DIAGNOSIS — K21.9 GASTROESOPHAGEAL REFLUX DISEASE WITHOUT ESOPHAGITIS: ICD-10-CM

## 2023-04-12 DIAGNOSIS — R73.03 PREDIABETES: ICD-10-CM

## 2023-04-12 DIAGNOSIS — E78.2 MIXED HYPERLIPIDEMIA: ICD-10-CM

## 2023-04-12 DIAGNOSIS — M54.50 CHRONIC BILATERAL LOW BACK PAIN WITHOUT SCIATICA: ICD-10-CM

## 2023-04-12 DIAGNOSIS — M54.6 ACUTE MIDLINE THORACIC BACK PAIN: Primary | ICD-10-CM

## 2023-04-12 DIAGNOSIS — Z12.11 SCREENING FOR MALIGNANT NEOPLASM OF COLON: ICD-10-CM

## 2023-04-12 DIAGNOSIS — Z12.11 SCREENING FOR COLON CANCER: Primary | ICD-10-CM

## 2023-04-12 DIAGNOSIS — G89.29 CHRONIC BILATERAL LOW BACK PAIN WITHOUT SCIATICA: ICD-10-CM

## 2023-04-12 DIAGNOSIS — F41.9 ANXIETY: ICD-10-CM

## 2023-04-12 DIAGNOSIS — M54.6 ACUTE MIDLINE THORACIC BACK PAIN: ICD-10-CM

## 2023-04-12 LAB
ALBUMIN SERPL BCP-MCNC: 3.9 G/DL (ref 3.5–5)
ALBUMIN/GLOB SERPL: 1.3 {RATIO}
ALP SERPL-CCNC: 83 U/L (ref 45–115)
ALT SERPL W P-5'-P-CCNC: 32 U/L (ref 16–61)
ANION GAP SERPL CALCULATED.3IONS-SCNC: 5 MMOL/L (ref 7–16)
AST SERPL W P-5'-P-CCNC: 18 U/L (ref 15–37)
BASOPHILS # BLD AUTO: 0.11 K/UL (ref 0–0.2)
BASOPHILS NFR BLD AUTO: 1.2 % (ref 0–1)
BILIRUB SERPL-MCNC: 0.5 MG/DL (ref ?–1.2)
BUN SERPL-MCNC: 16 MG/DL (ref 7–18)
BUN/CREAT SERPL: 18 (ref 6–20)
CALCIUM SERPL-MCNC: 9.8 MG/DL (ref 8.5–10.1)
CHLORIDE SERPL-SCNC: 104 MMOL/L (ref 98–107)
CHOLEST SERPL-MCNC: 157 MG/DL (ref 0–200)
CHOLEST/HDLC SERPL: 2.8 {RATIO}
CO2 SERPL-SCNC: 33 MMOL/L (ref 21–32)
CREAT SERPL-MCNC: 0.91 MG/DL (ref 0.7–1.3)
DIFFERENTIAL METHOD BLD: ABNORMAL
EGFR (NO RACE VARIABLE) (RUSH/TITUS): 97 ML/MIN/1.73M²
EOSINOPHIL # BLD AUTO: 0.29 K/UL (ref 0–0.5)
EOSINOPHIL NFR BLD AUTO: 3.1 % (ref 1–4)
ERYTHROCYTE [DISTWIDTH] IN BLOOD BY AUTOMATED COUNT: 14 % (ref 11.5–14.5)
GLOBULIN SER-MCNC: 3.1 G/DL (ref 2–4)
GLUCOSE SERPL-MCNC: 80 MG/DL (ref 74–106)
HCT VFR BLD AUTO: 47.6 % (ref 40–54)
HDLC SERPL-MCNC: 57 MG/DL (ref 40–60)
HGB BLD-MCNC: 14.8 G/DL (ref 13.5–18)
IMM GRANULOCYTES # BLD AUTO: 0.04 K/UL (ref 0–0.04)
IMM GRANULOCYTES NFR BLD: 0.4 % (ref 0–0.4)
LDLC SERPL CALC-MCNC: 88 MG/DL
LDLC/HDLC SERPL: 1.5 {RATIO}
LYMPHOCYTES # BLD AUTO: 3.12 K/UL (ref 1–4.8)
LYMPHOCYTES NFR BLD AUTO: 33.7 % (ref 27–41)
MCH RBC QN AUTO: 29 PG (ref 27–31)
MCHC RBC AUTO-ENTMCNC: 31.1 G/DL (ref 32–36)
MCV RBC AUTO: 93.2 FL (ref 80–96)
MONOCYTES # BLD AUTO: 0.7 K/UL (ref 0–0.8)
MONOCYTES NFR BLD AUTO: 7.6 % (ref 2–6)
MPC BLD CALC-MCNC: 10.6 FL (ref 9.4–12.4)
NEUTROPHILS # BLD AUTO: 5.01 K/UL (ref 1.8–7.7)
NEUTROPHILS NFR BLD AUTO: 54 % (ref 53–65)
NONHDLC SERPL-MCNC: 100 MG/DL
NRBC # BLD AUTO: 0 X10E3/UL
NRBC, AUTO (.00): 0 %
PLATELET # BLD AUTO: 300 K/UL (ref 150–400)
POTASSIUM SERPL-SCNC: 4.8 MMOL/L (ref 3.5–5.1)
PROT SERPL-MCNC: 7 G/DL (ref 6.4–8.2)
RBC # BLD AUTO: 5.11 M/UL (ref 4.6–6.2)
SODIUM SERPL-SCNC: 137 MMOL/L (ref 136–145)
TRIGL SERPL-MCNC: 59 MG/DL (ref 35–150)
VLDLC SERPL-MCNC: 12 MG/DL
WBC # BLD AUTO: 9.27 K/UL (ref 4.5–11)

## 2023-04-12 PROCEDURE — 1159F PR MEDICATION LIST DOCUMENTED IN MEDICAL RECORD: ICD-10-PCS | Mod: ,,, | Performed by: NURSE PRACTITIONER

## 2023-04-12 PROCEDURE — 3075F PR MOST RECENT SYSTOLIC BLOOD PRESS GE 130-139MM HG: ICD-10-PCS | Mod: ,,, | Performed by: NURSE PRACTITIONER

## 2023-04-12 PROCEDURE — 80053 COMPREHEN METABOLIC PANEL: CPT | Mod: ,,, | Performed by: CLINICAL MEDICAL LABORATORY

## 2023-04-12 PROCEDURE — 72070 XR THORACIC SPINE AP LATERAL: ICD-10-PCS | Mod: 26,,, | Performed by: RADIOLOGY

## 2023-04-12 PROCEDURE — 36415 COLL VENOUS BLD VENIPUNCTURE: CPT | Mod: ,,, | Performed by: CLINICAL MEDICAL LABORATORY

## 2023-04-12 PROCEDURE — 99214 PR OFFICE/OUTPT VISIT, EST, LEVL IV, 30-39 MIN: ICD-10-PCS | Mod: ,,, | Performed by: NURSE PRACTITIONER

## 2023-04-12 PROCEDURE — 85025 CBC WITH DIFFERENTIAL: ICD-10-PCS | Mod: ,,, | Performed by: CLINICAL MEDICAL LABORATORY

## 2023-04-12 PROCEDURE — 72070 X-RAY EXAM THORAC SPINE 2VWS: CPT | Mod: TC,RHCUB | Performed by: NURSE PRACTITIONER

## 2023-04-12 PROCEDURE — 3079F PR MOST RECENT DIASTOLIC BLOOD PRESSURE 80-89 MM HG: ICD-10-PCS | Mod: ,,, | Performed by: NURSE PRACTITIONER

## 2023-04-12 PROCEDURE — 36415 PR COLLECTION VENOUS BLOOD,VENIPUNCTURE: ICD-10-PCS | Mod: ,,, | Performed by: CLINICAL MEDICAL LABORATORY

## 2023-04-12 PROCEDURE — 3008F BODY MASS INDEX DOCD: CPT | Mod: ,,, | Performed by: NURSE PRACTITIONER

## 2023-04-12 PROCEDURE — 72070 X-RAY EXAM THORAC SPINE 2VWS: CPT | Mod: 26,,, | Performed by: RADIOLOGY

## 2023-04-12 PROCEDURE — 1160F RVW MEDS BY RX/DR IN RCRD: CPT | Mod: ,,, | Performed by: NURSE PRACTITIONER

## 2023-04-12 PROCEDURE — 80061 LIPID PANEL: CPT | Mod: ,,, | Performed by: CLINICAL MEDICAL LABORATORY

## 2023-04-12 PROCEDURE — 1160F PR REVIEW ALL MEDS BY PRESCRIBER/CLIN PHARMACIST DOCUMENTED: ICD-10-PCS | Mod: ,,, | Performed by: NURSE PRACTITIONER

## 2023-04-12 PROCEDURE — 4010F PR ACE/ARB THEARPY RXD/TAKEN: ICD-10-PCS | Mod: ,,, | Performed by: NURSE PRACTITIONER

## 2023-04-12 PROCEDURE — 99214 OFFICE O/P EST MOD 30 MIN: CPT | Mod: ,,, | Performed by: NURSE PRACTITIONER

## 2023-04-12 PROCEDURE — 1159F MED LIST DOCD IN RCRD: CPT | Mod: ,,, | Performed by: NURSE PRACTITIONER

## 2023-04-12 PROCEDURE — 4010F ACE/ARB THERAPY RXD/TAKEN: CPT | Mod: ,,, | Performed by: NURSE PRACTITIONER

## 2023-04-12 PROCEDURE — 80053 COMPREHENSIVE METABOLIC PANEL: ICD-10-PCS | Mod: ,,, | Performed by: CLINICAL MEDICAL LABORATORY

## 2023-04-12 PROCEDURE — 80061 LIPID PANEL: ICD-10-PCS | Mod: ,,, | Performed by: CLINICAL MEDICAL LABORATORY

## 2023-04-12 PROCEDURE — 3008F PR BODY MASS INDEX (BMI) DOCUMENTED: ICD-10-PCS | Mod: ,,, | Performed by: NURSE PRACTITIONER

## 2023-04-12 PROCEDURE — 3079F DIAST BP 80-89 MM HG: CPT | Mod: ,,, | Performed by: NURSE PRACTITIONER

## 2023-04-12 PROCEDURE — 3075F SYST BP GE 130 - 139MM HG: CPT | Mod: ,,, | Performed by: NURSE PRACTITIONER

## 2023-04-12 PROCEDURE — 85025 COMPLETE CBC W/AUTO DIFF WBC: CPT | Mod: ,,, | Performed by: CLINICAL MEDICAL LABORATORY

## 2023-04-12 RX ORDER — TIOTROPIUM BROMIDE 18 UG/1
1 CAPSULE ORAL; RESPIRATORY (INHALATION)
COMMUNITY
Start: 2023-02-21 | End: 2023-11-03

## 2023-04-12 RX ORDER — TIZANIDINE 4 MG/1
4 TABLET ORAL EVERY 8 HOURS
Qty: 270 TABLET | Refills: 0 | Status: SHIPPED | OUTPATIENT
Start: 2023-04-12

## 2023-04-12 RX ORDER — ROSUVASTATIN CALCIUM 20 MG/1
20 TABLET, COATED ORAL NIGHTLY
Qty: 90 TABLET | Refills: 1 | Status: SHIPPED | OUTPATIENT
Start: 2023-04-12 | End: 2023-11-03 | Stop reason: SDUPTHER

## 2023-04-12 RX ORDER — TIZANIDINE 4 MG/1
4 TABLET ORAL EVERY 8 HOURS
COMMUNITY
End: 2023-04-12 | Stop reason: SDUPTHER

## 2023-04-12 RX ORDER — ESCITALOPRAM OXALATE 20 MG/1
20 TABLET ORAL DAILY
Qty: 90 TABLET | Refills: 1 | Status: SHIPPED | OUTPATIENT
Start: 2023-04-12 | End: 2023-11-03 | Stop reason: SDUPTHER

## 2023-04-12 RX ORDER — POLYETHYLENE GLYCOL 3350, SODIUM SULFATE ANHYDROUS, SODIUM BICARBONATE, SODIUM CHLORIDE, POTASSIUM CHLORIDE 236; 22.74; 6.74; 5.86; 2.97 G/4L; G/4L; G/4L; G/4L; G/4L
4 POWDER, FOR SOLUTION ORAL ONCE
Qty: 4000 ML | Refills: 0 | Status: SHIPPED | OUTPATIENT
Start: 2023-04-12 | End: 2023-04-12

## 2023-04-12 RX ORDER — GABAPENTIN 300 MG/1
600 CAPSULE ORAL 2 TIMES DAILY
Qty: 360 CAPSULE | Refills: 1 | Status: SHIPPED | OUTPATIENT
Start: 2023-04-12 | End: 2024-04-11

## 2023-04-12 RX ORDER — MELOXICAM 7.5 MG/1
7.5 TABLET ORAL DAILY
Qty: 90 TABLET | Refills: 1 | Status: SHIPPED | OUTPATIENT
Start: 2023-04-12

## 2023-04-13 NOTE — PROGRESS NOTES
KATRIN Ferreira        PATIENT NAME: Andrez Wen  : 1965  DATE: 23  MRN: [id number]      Billing Provider: KATRIN Ferreira  Level of Service:   Patient PCP Information       Provider PCP Type    KATRIN Ferreira General            Reason for Visit / Chief Complaint: Follow-up (3 month follow up. Pt is not fasting. Pt reports he feels his lexapro has not been helping much, states just passed away 23. Pt has moderate depression score of 14//Care gaps: pt defers pneumonia/tetanus/shingles/covid vaccines and HepC/HIV screening at this time)         History of Present Illness / Problem Focused Workflow     Andrze Wen presents to the clinic with Follow-up (3 month follow up. Pt is not fasting. Pt reports he feels his lexapro has not been helping much, states just passed away 23. Pt has moderate depression score of 14//Care gaps: pt defers pneumonia/tetanus/shingles/covid vaccines and HepC/HIV screening at this time)     Mr. Wen presents to clinic in follow-up, today he complains of thoracic pain in between shoulder blades that has been persistent, often worse while sleeping, also pain to anterior bilateral shoulders, denies prior imaging.  He also complains of worsening depression related to recent death in the family, he also cares for multiple family members in his home.  He had a laceration to right hand that he received 6 stitches in the emergency room about 2 weeks ago, he is requesting that we remove stitches.    Follow-up      Review of Systems     Review of Systems   Constitutional: Negative.    Respiratory: Negative.     Cardiovascular: Negative.    Gastrointestinal: Negative.    Musculoskeletal:  Positive for back pain.   Integumentary:  Positive for wound (right hand (thumb)).   Neurological: Negative.    Psychiatric/Behavioral:  Positive for dysphoric mood. Negative for sleep disturbance. The patient is nervous/anxious.       Medical / Social / Family  History     Past Medical History:   Diagnosis Date    Chronic heart failure     COPD (chronic obstructive pulmonary disease)        History reviewed. No pertinent surgical history.    Social History  Mr. Andrez Wen   reports that he has been smoking. He has never used smokeless tobacco. He reports current alcohol use. He reports current drug use. Frequency: 7.00 times per week. Drug: Marijuana.    Family History  'alban Wen  Family history is unknown by patient.    Medications and Allergies     Medications  Outpatient Medications Marked as Taking for the 4/12/23 encounter (Office Visit) with KATRIN Ferreira   Medication Sig Dispense Refill    albuterol (PROVENTIL) 2.5 mg /3 mL (0.083 %) nebulizer solution Take 3 mLs (2.5 mg total) by nebulization 2 (two) times a day. 3 mL 5    albuterol (PROVENTIL/VENTOLIN HFA) 90 mcg/actuation inhaler Proventil HFA 90 mcg/actuation aerosol inhaler  Inhale 2 puffs every 4 hours by inhalation route. 18 g 5    budesonide-glycopyr-formoterol (BREZTRI AEROSPHERE) 160-9-4.8 mcg/actuation HFAA Inhale 2 puffs into the lungs 2 (two) times daily. 10.7 g 5    losartan (COZAAR) 50 MG tablet Take 1 tablet (50 mg total) by mouth once daily. 90 tablet 3    pantoprazole (PROTONIX) 40 MG tablet Take 1 tablet (40 mg total) by mouth once daily. 90 tablet 1    SPIRIVA WITH HANDIHALER 18 mcg inhalation capsule 1 capsule.      [DISCONTINUED] EScitalopram oxalate (LEXAPRO) 10 MG tablet Take 1 tablet (10 mg total) by mouth once daily. 90 tablet 1    [DISCONTINUED] gabapentin (NEURONTIN) 300 MG capsule Take 2 capsules (600 mg total) by mouth 2 (two) times daily. 360 capsule 1    [DISCONTINUED] meloxicam (MOBIC) 7.5 MG tablet TAKE 1 TABLET ONE TIME DAILY 90 tablet 1    [DISCONTINUED] rosuvastatin (CRESTOR) 20 MG tablet Take 1 tablet (20 mg total) by mouth every evening. 90 tablet 3    [DISCONTINUED] tiZANidine (ZANAFLEX) 4 MG tablet Take 4 mg by mouth every 8 (eight) hours.          Allergies  Review of patient's allergies indicates:  No Known Allergies      Vitals:    04/12/23 1002   BP: 130/88   Pulse: 60   Resp: 18   Temp: 98.4 °F (36.9 °C)     Physical Exam  Vitals and nursing note reviewed.   Constitutional:       Appearance: Normal appearance. He is obese.   HENT:      Head: Normocephalic.   Cardiovascular:      Rate and Rhythm: Normal rate and regular rhythm.   Pulmonary:      Breath sounds: Normal breath sounds.   Abdominal:      Palpations: Abdomen is soft.   Musculoskeletal:         General: Tenderness present. Normal range of motion.      Cervical back: Normal range of motion.      Thoracic back: Spasms and tenderness present.        Back:    Skin:     General: Skin is warm and dry.             Comments: 6 sutures removed intact from lac/abrasion to right hand   Neurological:      Mental Status: He is alert and oriented to person, place, and time.   Psychiatric:         Behavior: Behavior normal.          Lab Results   Component Value Date    WBC 9.27 04/12/2023    HGB 14.8 04/12/2023    HCT 47.6 04/12/2023    MCV 93.2 04/12/2023     04/12/2023          Sodium   Date Value Ref Range Status   04/12/2023 137 136 - 145 mmol/L Final     Potassium   Date Value Ref Range Status   04/12/2023 4.8 3.5 - 5.1 mmol/L Final     Chloride   Date Value Ref Range Status   04/12/2023 104 98 - 107 mmol/L Final     CO2   Date Value Ref Range Status   04/12/2023 33 (H) 21 - 32 mmol/L Final     Glucose   Date Value Ref Range Status   04/12/2023 80 74 - 106 mg/dL Final     BUN   Date Value Ref Range Status   04/12/2023 16 7 - 18 mg/dL Final     Creatinine   Date Value Ref Range Status   04/12/2023 0.91 0.70 - 1.30 mg/dL Final     Calcium   Date Value Ref Range Status   04/12/2023 9.8 8.5 - 10.1 mg/dL Final     Total Protein   Date Value Ref Range Status   04/12/2023 7.0 6.4 - 8.2 g/dL Final     Albumin   Date Value Ref Range Status   04/12/2023 3.9 3.5 - 5.0 g/dL Final     Bilirubin, Total    Date Value Ref Range Status   04/12/2023 0.5 >0.0 - 1.2 mg/dL Final     Alk Phos   Date Value Ref Range Status   04/12/2023 83 45 - 115 U/L Final     AST   Date Value Ref Range Status   04/12/2023 18 15 - 37 U/L Final     ALT   Date Value Ref Range Status   04/12/2023 32 16 - 61 U/L Final     Anion Gap   Date Value Ref Range Status   04/12/2023 5 (L) 7 - 16 mmol/L Final     eGFR   Date Value Ref Range Status   04/12/2023 97 >=60 mL/min/1.73m² Final        Lab Results   Component Value Date    CHOL 157 04/12/2023    CHOL 184 11/16/2022    CHOL 149 03/10/2022     Lab Results   Component Value Date    HDL 57 04/12/2023    HDL 48 11/16/2022    HDL 44 03/10/2022     Lab Results   Component Value Date    LDLCALC 88 04/12/2023    LDLCALC 120 11/16/2022    LDLCALC 87 03/10/2022     Lab Results   Component Value Date    TRIG 59 04/12/2023    TRIG 78 11/16/2022    TRIG 90 03/10/2022     Lab Results   Component Value Date    CHOLHDL 2.8 04/12/2023    CHOLHDL 3.8 11/16/2022    CHOLHDL 3.4 03/10/2022        Lab Results   Component Value Date    HGBA1C 5.7 08/22/2022        Lab Results   Component Value Date    TSH 1.810 03/10/2022    FREET4 0.88 08/24/2021        PSA Total (ng/mL)   Date Value   03/10/2022 0.963          Health Maintenance Due   Topic Date Due    Hepatitis C Screening  Never done    Pneumococcal Vaccines (Age 0-64) (1 - PCV) Never done    HIV Screening  Never done    TETANUS VACCINE  Never done    Colorectal Cancer Screening  Never done    Shingles Vaccine (1 of 2) Never done    COVID-19 Vaccine (3 - Booster for Moderna series) 05/21/2021       Problem List Items Addressed This Visit          Psychiatric    Anxiety    Relevant Medications    EScitalopram oxalate (LEXAPRO) 20 MG tablet    Other Relevant Orders    CBC Auto Differential (Completed)       Cardiac/Vascular    Hyperlipidemia    Relevant Medications    rosuvastatin (CRESTOR) 20 MG tablet    Other Relevant Orders    Lipid Panel (Completed)     Comprehensive Metabolic Panel (Completed)       GI    Gastroesophageal reflux disease    Relevant Orders    Comprehensive Metabolic Panel (Completed)     Other Visit Diagnoses       Acute midline thoracic back pain    -  Primary    Relevant Orders    X-Ray Thoracic Spine AP Lateral (Completed)    Screening for malignant neoplasm of colon        Relevant Orders    Ambulatory referral/consult to Gastroenterology    Chronic bilateral low back pain without sciatica        Relevant Medications    gabapentin (NEURONTIN) 300 MG capsule    meloxicam (MOBIC) 7.5 MG tablet    tiZANidine (ZANAFLEX) 4 MG tablet    Prediabetes        Relevant Orders    Comprehensive Metabolic Panel (Completed)    CBC Auto Differential (Completed)          Fasting labs today, we will notify you of results.   Thoracic xray - we will notify you of results.  6 sutures removed from right hand without difficulty, pt. Tolerated well, no s/s of infection.        Health Maintenance Topics with due status: Not Due       Topic Last Completion Date    Hemoglobin A1c (Prediabetes) 08/22/2022    Lipid Panel 04/12/2023       Future Appointments   Date Time Provider Department Center   8/30/2023 11:00 AM [facility] GI ROOM 01 [facility]        There are no Patient Instructions on file for this visit.  No follow-ups on file.       Date of encounter: 4/12/23

## 2023-04-14 ENCOUNTER — TELEPHONE (OUTPATIENT)
Dept: FAMILY MEDICINE | Facility: CLINIC | Age: 58
End: 2023-04-14
Payer: MEDICARE

## 2023-04-14 NOTE — TELEPHONE ENCOUNTER
----- Message from KATRIN Ferreira sent at 4/13/2023  5:43 PM CDT -----  Multilevel degenerative changes throughout thoracic spine.

## 2023-04-14 NOTE — LETTER
April 14, 2023    Andrez Wen  [address] [address] [place] [address]             [facility] [place] [address] [place] [address]  Phone: [phone]  Fax: [phone] Dear Mr. Wen:    Your recent xray of thoracic spine showed multilevel degenerative changes throughout your thoracic spine.       If you have any questions or concerns, please don't hesitate to call.    Sincerely,      Nicolasa Ramírez lpn

## 2023-04-14 NOTE — TELEPHONE ENCOUNTER
----- Message from KATRIN Ferreira sent at 4/13/2023  5:35 PM CDT -----  Labs ok. Cholesterol improved

## 2023-04-14 NOTE — LETTER
April 14, 2023    Andrez Wen  [address] [place] [address]             [facility]  [address] [place] [address] [place] [address]  Phone: [phone]  Fax: [phone] Dear Mr. Wen:    Your recent labs were resulted as normal with improvement in your cholesterol.       If you have any questions or concerns, please don't hesitate to call.    Sincerely,      Nicolasa Ramírez lpn

## 2023-05-11 ENCOUNTER — HOSPITAL ENCOUNTER (OUTPATIENT)
Dept: RADIOLOGY | Facility: HOSPITAL | Age: 58
Discharge: HOME | End: 2023-05-11
Attending: NURSE PRACTITIONER
Payer: MEDICARE

## 2023-05-11 DIAGNOSIS — M25.519 PAIN IN JOINT, SHOULDER REGION: ICD-10-CM

## 2023-05-11 DIAGNOSIS — M25.552 LEFT HIP PAIN: ICD-10-CM

## 2023-05-11 DIAGNOSIS — M25.551 RIGHT HIP PAIN: ICD-10-CM

## 2023-05-11 DIAGNOSIS — M54.2 CERVICALGIA: ICD-10-CM

## 2023-05-11 DIAGNOSIS — M54.50 LUMBAGO: ICD-10-CM

## 2023-05-11 PROCEDURE — 73522 X-RAY EXAM HIPS BI 3-4 VIEWS: CPT | Mod: TC

## 2023-05-11 PROCEDURE — 72050 X-RAY EXAM NECK SPINE 4/5VWS: CPT | Mod: 26,,, | Performed by: RADIOLOGY

## 2023-05-11 PROCEDURE — 72110 X-RAY EXAM L-2 SPINE 4/>VWS: CPT | Mod: 26,,, | Performed by: RADIOLOGY

## 2023-05-11 PROCEDURE — 72050 X-RAY EXAM NECK SPINE 4/5VWS: CPT | Mod: TC

## 2023-05-11 PROCEDURE — 73030 XR SHOULDER COMPLETE 2 OR MORE VIEWS BILATERAL: ICD-10-PCS | Mod: 26,50,, | Performed by: RADIOLOGY

## 2023-05-11 PROCEDURE — 73030 X-RAY EXAM OF SHOULDER: CPT | Mod: 26,50,, | Performed by: RADIOLOGY

## 2023-05-11 PROCEDURE — 72110 X-RAY EXAM L-2 SPINE 4/>VWS: CPT | Mod: TC

## 2023-05-11 PROCEDURE — 73522 X-RAY EXAM HIPS BI 3-4 VIEWS: CPT | Mod: 26,,, | Performed by: RADIOLOGY

## 2023-05-11 PROCEDURE — 73030 X-RAY EXAM OF SHOULDER: CPT | Mod: TC,50

## 2023-05-11 PROCEDURE — 72050 XR CERVICAL SPINE AP LAT WITH FLEX EXTEN: ICD-10-PCS | Mod: 26,,, | Performed by: RADIOLOGY

## 2023-05-11 PROCEDURE — 73522 XR HIP 3 OR 4 VIEWS BILATERAL: ICD-10-PCS | Mod: 26,,, | Performed by: RADIOLOGY

## 2023-05-11 PROCEDURE — 72110 XR LUMBAR SPINE 4-5 VIEW WITH BENDING VIEWS: ICD-10-PCS | Mod: 26,,, | Performed by: RADIOLOGY

## 2023-06-21 ENCOUNTER — OFFICE VISIT (OUTPATIENT)
Dept: ORTHOPEDICS | Facility: CLINIC | Age: 58
End: 2023-06-21
Payer: MEDICARE

## 2023-06-21 VITALS — BODY MASS INDEX: 34.3 KG/M2 | WEIGHT: 245 LBS | HEIGHT: 71 IN

## 2023-06-21 DIAGNOSIS — Z01.810 PRE-OPERATIVE CARDIOVASCULAR EXAMINATION: ICD-10-CM

## 2023-06-21 DIAGNOSIS — Z01.812 PRE-OPERATIVE LABORATORY EXAMINATION: ICD-10-CM

## 2023-06-21 DIAGNOSIS — Z01.811 PRE-OPERATIVE RESPIRATORY EXAMINATION: ICD-10-CM

## 2023-06-21 DIAGNOSIS — M25.511 RIGHT SHOULDER PAIN, UNSPECIFIED CHRONICITY: Primary | ICD-10-CM

## 2023-06-21 DIAGNOSIS — S46.011A TRAUMATIC COMPLETE TEAR OF RIGHT ROTATOR CUFF, INITIAL ENCOUNTER: ICD-10-CM

## 2023-06-21 PROCEDURE — 99204 OFFICE O/P NEW MOD 45 MIN: CPT | Mod: S$PBB,,, | Performed by: ORTHOPAEDIC SURGERY

## 2023-06-21 PROCEDURE — 1159F PR MEDICATION LIST DOCUMENTED IN MEDICAL RECORD: ICD-10-PCS | Mod: CPTII,,, | Performed by: ORTHOPAEDIC SURGERY

## 2023-06-21 PROCEDURE — 1159F MED LIST DOCD IN RCRD: CPT | Mod: CPTII,,, | Performed by: ORTHOPAEDIC SURGERY

## 2023-06-21 PROCEDURE — 4010F ACE/ARB THERAPY RXD/TAKEN: CPT | Mod: CPTII,,, | Performed by: ORTHOPAEDIC SURGERY

## 2023-06-21 PROCEDURE — 3008F BODY MASS INDEX DOCD: CPT | Mod: CPTII,,, | Performed by: ORTHOPAEDIC SURGERY

## 2023-06-21 PROCEDURE — 99204 PR OFFICE/OUTPT VISIT, NEW, LEVL IV, 45-59 MIN: ICD-10-PCS | Mod: S$PBB,,, | Performed by: ORTHOPAEDIC SURGERY

## 2023-06-21 PROCEDURE — 3008F PR BODY MASS INDEX (BMI) DOCUMENTED: ICD-10-PCS | Mod: CPTII,,, | Performed by: ORTHOPAEDIC SURGERY

## 2023-06-21 PROCEDURE — 4010F PR ACE/ARB THEARPY RXD/TAKEN: ICD-10-PCS | Mod: CPTII,,, | Performed by: ORTHOPAEDIC SURGERY

## 2023-06-21 PROCEDURE — 99213 OFFICE O/P EST LOW 20 MIN: CPT | Mod: PBBFAC | Performed by: ORTHOPAEDIC SURGERY

## 2023-06-21 NOTE — PROGRESS NOTES
Clinic Note        CC:   Chief Complaint   Patient presents with    Right Shoulder - Pain        Principal problem: Right shoulder pain, unspecified chronicity [M25.511]     REASON FOR VISIT:       HISTORY:  57-year-old male complaining of bilateral shoulder pain after he was lifting some heavy board some felt a pop in both shoulders his right more painful left he has difficulty bringing into forward flexion and abduction.  Denies any numbness or tingling.  MRI shows full-thickness supraspinatus tear with injury to the infraspinatus with AC arthritis.  His x-rays reviewed from 05/11/2023.      PAST MEDICAL HISTORY:   Past Medical History:   Diagnosis Date    Chronic heart failure     COPD (chronic obstructive pulmonary disease)           PAST SURGICAL HISTORY: History reviewed. No pertinent surgical history.       ALLERGIES: Review of patient's allergies indicates:  No Known Allergies     MEDICATIONS :    Current Outpatient Medications:     albuterol (PROVENTIL) 2.5 mg /3 mL (0.083 %) nebulizer solution, Take 3 mLs (2.5 mg total) by nebulization 2 (two) times a day., Disp: 3 mL, Rfl: 5    albuterol (PROVENTIL/VENTOLIN HFA) 90 mcg/actuation inhaler, Proventil HFA 90 mcg/actuation aerosol inhaler  Inhale 2 puffs every 4 hours by inhalation route., Disp: 18 g, Rfl: 5    budesonide-glycopyr-formoterol (BREZTRI AEROSPHERE) 160-9-4.8 mcg/actuation HFAA, Inhale 2 puffs into the lungs 2 (two) times daily., Disp: 10.7 g, Rfl: 5    EScitalopram oxalate (LEXAPRO) 20 MG tablet, Take 1 tablet (20 mg total) by mouth once daily., Disp: 90 tablet, Rfl: 1    gabapentin (NEURONTIN) 300 MG capsule, Take 2 capsules (600 mg total) by mouth 2 (two) times daily., Disp: 360 capsule, Rfl: 1    losartan (COZAAR) 50 MG tablet, Take 1 tablet (50 mg total) by mouth once daily., Disp: 90 tablet, Rfl: 3    meloxicam (MOBIC) 7.5 MG tablet, Take 1 tablet (7.5 mg total) by mouth once daily., Disp: 90 tablet, Rfl: 1    pantoprazole  (PROTONIX) 40 MG tablet, Take 1 tablet (40 mg total) by mouth once daily., Disp: 90 tablet, Rfl: 1    rosuvastatin (CRESTOR) 20 MG tablet, Take 1 tablet (20 mg total) by mouth every evening., Disp: 90 tablet, Rfl: 1    SPIRIVA WITH HANDIHALER 18 mcg inhalation capsule, 1 capsule., Disp: , Rfl:     tiZANidine (ZANAFLEX) 4 MG tablet, Take 1 tablet (4 mg total) by mouth every 8 (eight) hours., Disp: 270 tablet, Rfl: 0     SOCIAL HISTORY:   Social History     Socioeconomic History    Marital status:    Tobacco Use    Smoking status: Every Day    Smokeless tobacco: Never    Tobacco comments:     Patient has tried quitting.    Substance and Sexual Activity    Alcohol use: Yes     Comment: vodka or crown sometimes.    Drug use: Yes     Frequency: 7.0 times per week     Types: Marijuana     Comment: tries not to    Sexual activity: Not Currently     Partners: Female          FAMILY HISTORY:   Family History   Family history unknown: Yes          PHYSICAL EXAM:  In general, this is a well-developed, well-nourished male . The patient is alert, oriented and cooperative.      HEENT:  Normocephalic, atraumatic.  Extraocular movements are intact bilaterally.  The oropharynx is benign.       NECK:  Nontender with good range of motion.      LUNGS:  Clear to auscultation bilaterally.      HEART:  Demonstrates a regular rate and rhythm.  No murmurs appreciated.      ABDOMEN:  Soft, non-tender, non-distended.        EXTREMITIES:  Right upper extremity patient moves his fingers has motor function 5/5 sensation intact to touch distally.  He is very tender over his AC joint anterior lateral acromion.  Pain on impingement crossed adduction testing.  Full passive motion shoulder actively he has weakness on forward flexion abduction of the shoulder negative sulcus test      RADIOGRAPHIC FINDINGS:  X-rays show degenerative changes AC joint no fracture subluxation MRI shows full-thickness supraspinatus tear with some  retraction      IMPRESSION: Right shoulder pain, unspecified chronicity         PLAN:  Patient has a full-thickness rotator cuff tear on right show healed her it is acute his pain is worsening not improving he is taken anti-inflammatories at this time we discussed treatment options we will set him up for arthroscopic evaluation debridement possible repair possible open repair of the acute rotator cuff tear.  He is already having weakness in the shoulder.  Discussed risks and benefits.  We will proceed with surgical intervention.  There are no Patient Instructions on file for this visit.      No follow-ups on file.         Javi Grady      (Subject to voice recognition error, transcription service not allowed)

## 2023-07-06 RX ORDER — ALBUTEROL SULFATE 90 UG/1
AEROSOL, METERED RESPIRATORY (INHALATION)
Qty: 18 G | Refills: 2 | Status: SHIPPED | OUTPATIENT
Start: 2023-07-06 | End: 2023-11-03 | Stop reason: SDUPTHER

## 2023-07-06 RX ORDER — ALBUTEROL SULFATE 0.83 MG/ML
2.5 SOLUTION RESPIRATORY (INHALATION) 2 TIMES DAILY
Qty: 3 ML | Refills: 5 | Status: SHIPPED | OUTPATIENT
Start: 2023-07-06 | End: 2023-12-12 | Stop reason: SDUPTHER

## 2023-07-06 RX ORDER — BUDESONIDE, GLYCOPYRROLATE, AND FORMOTEROL FUMARATE 160; 9; 4.8 UG/1; UG/1; UG/1
2 AEROSOL, METERED RESPIRATORY (INHALATION) 2 TIMES DAILY
Qty: 10.7 G | Refills: 2 | Status: SHIPPED | OUTPATIENT
Start: 2023-07-06 | End: 2023-11-03 | Stop reason: SDUPTHER

## 2023-07-06 RX ORDER — PANTOPRAZOLE SODIUM 40 MG/1
40 TABLET, DELAYED RELEASE ORAL DAILY
Qty: 90 TABLET | Refills: 0 | Status: SHIPPED | OUTPATIENT
Start: 2023-07-06 | End: 2023-11-03 | Stop reason: SDUPTHER

## 2023-07-09 DIAGNOSIS — Z71.89 COMPLEX CARE COORDINATION: ICD-10-CM

## 2023-07-10 ENCOUNTER — TELEPHONE (OUTPATIENT)
Dept: ORTHOPEDICS | Facility: CLINIC | Age: 58
End: 2023-07-10
Payer: MEDICARE

## 2023-07-10 NOTE — TELEPHONE ENCOUNTER
----- Message from Ruth Freed sent at 7/5/2023  9:19 AM CDT -----  Pt is needing to reschedule surgery towards November.  [phone]

## 2023-11-03 ENCOUNTER — OFFICE VISIT (OUTPATIENT)
Dept: FAMILY MEDICINE | Facility: CLINIC | Age: 58
End: 2023-11-03
Payer: MEDICARE

## 2023-11-03 VITALS
SYSTOLIC BLOOD PRESSURE: 124 MMHG | DIASTOLIC BLOOD PRESSURE: 86 MMHG | BODY MASS INDEX: 34.17 KG/M2 | HEART RATE: 81 BPM | HEIGHT: 71 IN | RESPIRATION RATE: 16 BRPM | OXYGEN SATURATION: 92 % | TEMPERATURE: 98 F

## 2023-11-03 DIAGNOSIS — F41.9 ANXIETY: ICD-10-CM

## 2023-11-03 DIAGNOSIS — E78.2 MIXED HYPERLIPIDEMIA: ICD-10-CM

## 2023-11-03 DIAGNOSIS — J44.9 CHRONIC OBSTRUCTIVE PULMONARY DISEASE, UNSPECIFIED COPD TYPE: Primary | ICD-10-CM

## 2023-11-03 DIAGNOSIS — K21.9 GASTROESOPHAGEAL REFLUX DISEASE WITHOUT ESOPHAGITIS: ICD-10-CM

## 2023-11-03 PROCEDURE — 4010F PR ACE/ARB THEARPY RXD/TAKEN: ICD-10-PCS | Mod: ,,, | Performed by: NURSE PRACTITIONER

## 2023-11-03 PROCEDURE — 1159F PR MEDICATION LIST DOCUMENTED IN MEDICAL RECORD: ICD-10-PCS | Mod: ,,, | Performed by: NURSE PRACTITIONER

## 2023-11-03 PROCEDURE — 99214 PR OFFICE/OUTPT VISIT, EST, LEVL IV, 30-39 MIN: ICD-10-PCS | Mod: ,,, | Performed by: NURSE PRACTITIONER

## 2023-11-03 PROCEDURE — 3008F BODY MASS INDEX DOCD: CPT | Mod: ,,, | Performed by: NURSE PRACTITIONER

## 2023-11-03 PROCEDURE — 3079F DIAST BP 80-89 MM HG: CPT | Mod: ,,, | Performed by: NURSE PRACTITIONER

## 2023-11-03 PROCEDURE — 3079F PR MOST RECENT DIASTOLIC BLOOD PRESSURE 80-89 MM HG: ICD-10-PCS | Mod: ,,, | Performed by: NURSE PRACTITIONER

## 2023-11-03 PROCEDURE — 3074F SYST BP LT 130 MM HG: CPT | Mod: ,,, | Performed by: NURSE PRACTITIONER

## 2023-11-03 PROCEDURE — 3008F PR BODY MASS INDEX (BMI) DOCUMENTED: ICD-10-PCS | Mod: ,,, | Performed by: NURSE PRACTITIONER

## 2023-11-03 PROCEDURE — 1159F MED LIST DOCD IN RCRD: CPT | Mod: ,,, | Performed by: NURSE PRACTITIONER

## 2023-11-03 PROCEDURE — 4010F ACE/ARB THERAPY RXD/TAKEN: CPT | Mod: ,,, | Performed by: NURSE PRACTITIONER

## 2023-11-03 PROCEDURE — 3074F PR MOST RECENT SYSTOLIC BLOOD PRESSURE < 130 MM HG: ICD-10-PCS | Mod: ,,, | Performed by: NURSE PRACTITIONER

## 2023-11-03 PROCEDURE — 1160F PR REVIEW ALL MEDS BY PRESCRIBER/CLIN PHARMACIST DOCUMENTED: ICD-10-PCS | Mod: ,,, | Performed by: NURSE PRACTITIONER

## 2023-11-03 PROCEDURE — 1160F RVW MEDS BY RX/DR IN RCRD: CPT | Mod: ,,, | Performed by: NURSE PRACTITIONER

## 2023-11-03 PROCEDURE — 99214 OFFICE O/P EST MOD 30 MIN: CPT | Mod: ,,, | Performed by: NURSE PRACTITIONER

## 2023-11-03 RX ORDER — PANTOPRAZOLE SODIUM 40 MG/1
40 TABLET, DELAYED RELEASE ORAL DAILY
Qty: 90 TABLET | Refills: 1 | Status: SHIPPED | OUTPATIENT
Start: 2023-11-03

## 2023-11-03 RX ORDER — HYDROCODONE BITARTRATE AND ACETAMINOPHEN 7.5; 325 MG/1; MG/1
1 TABLET ORAL 2 TIMES DAILY PRN
COMMUNITY
Start: 2023-09-21

## 2023-11-03 RX ORDER — ESCITALOPRAM OXALATE 20 MG/1
20 TABLET ORAL DAILY
Qty: 90 TABLET | Refills: 1 | Status: SHIPPED | OUTPATIENT
Start: 2023-11-03

## 2023-11-03 RX ORDER — ROSUVASTATIN CALCIUM 20 MG/1
20 TABLET, COATED ORAL NIGHTLY
Qty: 90 TABLET | Refills: 1 | Status: SHIPPED | OUTPATIENT
Start: 2023-11-03

## 2023-11-03 RX ORDER — TIOTROPIUM BROMIDE 18 UG/1
1 CAPSULE ORAL; RESPIRATORY (INHALATION)
Qty: 90 CAPSULE | Refills: 1 | Status: CANCELLED | OUTPATIENT
Start: 2023-11-03

## 2023-11-03 RX ORDER — ALBUTEROL SULFATE 90 UG/1
AEROSOL, METERED RESPIRATORY (INHALATION)
Qty: 18 G | Refills: 2 | Status: SHIPPED | OUTPATIENT
Start: 2023-11-03 | End: 2023-12-12 | Stop reason: SDUPTHER

## 2023-11-03 RX ORDER — BUDESONIDE, GLYCOPYRROLATE, AND FORMOTEROL FUMARATE 160; 9; 4.8 UG/1; UG/1; UG/1
2 AEROSOL, METERED RESPIRATORY (INHALATION) 2 TIMES DAILY
Qty: 10.7 G | Refills: 2 | Status: SHIPPED | OUTPATIENT
Start: 2023-11-03 | End: 2023-12-12 | Stop reason: SDUPTHER

## 2023-11-03 RX ORDER — PREGABALIN 75 MG/1
75 CAPSULE ORAL 3 TIMES DAILY
COMMUNITY
Start: 2023-05-11 | End: 2023-12-28

## 2023-11-03 RX ORDER — DIAZEPAM 5 MG/1
TABLET ORAL
COMMUNITY
Start: 2023-06-14 | End: 2023-11-03 | Stop reason: ALTCHOICE

## 2023-11-03 NOTE — PROGRESS NOTES
"[facility] Medicine    Chief Complaint      Chief Complaint   Patient presents with    Medication Refill       History of Present Illness      Andrez Wen is a 58 y.o. male. He  has a past medical history of Chronic heart failure and COPD (chronic obstructive pulmonary disease)., who presents today for f/u and medication refills. States he has been out of most of his medications about 2 weeks now.      Patient is wheezing today but has been out of his inhaler x2 wks- states he takes breathing txs 4x daily and declined one while in clinic.  He reports in the past he was on supplemental oxygen x5 yrs but states "they" told him that his O2 sats were 92% so they took him off of it.  His sat today is 92% as well. Patient also admits that he is still smoking which I strongly urged him to consider quitting. Also states he has a Cpap which he doesn't use due to not being able to find a mask that suits his liking- has tried several different types.     Reviewed patient's most recent labs- all were stable.  Lipid panel was WNL.     Patient reports he is a patient of Dr. Deluna for his chronic pain in his neck, shoulders, low back, and hips.     Past Medical History:  Past Medical History:   Diagnosis Date    Chronic heart failure     COPD (chronic obstructive pulmonary disease)        Past Surgical History:   has no past surgical history on file.    Social History:  Social History     Tobacco Use    Smoking status: Every Day     Passive exposure: Current    Smokeless tobacco: Never    Tobacco comments:     Patient has tried quitting.    Substance Use Topics    Alcohol use: Yes     Comment: vodka or crown sometimes.    Drug use: Yes     Frequency: 7.0 times per week     Types: Marijuana     Comment: tries not to       I personally reviewed all past medical, surgical, and social.     Review of Systems   Constitutional:  Negative for unexpected weight change.   HENT:  Negative for trouble swallowing.    Eyes:  Negative for " visual disturbance.   Respiratory:  Positive for cough, chest tightness and wheezing. Negative for shortness of breath.    Cardiovascular: Negative.    Gastrointestinal:  Negative for abdominal pain, constipation, diarrhea, nausea and vomiting.   Genitourinary:  Negative for dysuria.   Musculoskeletal:  Negative for gait problem.   Skin: Negative.    Neurological:  Negative for dizziness, light-headedness and headaches.        Medications:  Outpatient Encounter Medications as of 11/3/2023   Medication Sig Dispense Refill    albuterol (PROVENTIL) 2.5 mg /3 mL (0.083 %) nebulizer solution Take 3 mLs (2.5 mg total) by nebulization 2 (two) times a day. 3 mL 5    gabapentin (NEURONTIN) 300 MG capsule Take 2 capsules (600 mg total) by mouth 2 (two) times daily. 360 capsule 1    HYDROcodone-acetaminophen (NORCO) 7.5-325 mg per tablet Take 1 tablet by mouth 2 (two) times daily as needed.      losartan (COZAAR) 50 MG tablet Take 1 tablet (50 mg total) by mouth once daily. 90 tablet 3    meloxicam (MOBIC) 7.5 MG tablet Take 1 tablet (7.5 mg total) by mouth once daily. 90 tablet 1    tiZANidine (ZANAFLEX) 4 MG tablet Take 1 tablet (4 mg total) by mouth every 8 (eight) hours. 270 tablet 0    [DISCONTINUED] albuterol (PROVENTIL/VENTOLIN HFA) 90 mcg/actuation inhaler Proventil HFA 90 mcg/actuation aerosol inhaler  Inhale 2 puffs every 4 hours by inhalation route. 18 g 2    [DISCONTINUED] budesonide-glycopyr-formoterol (BREZTRI AEROSPHERE) 160-9-4.8 mcg/actuation HFAA Inhale 2 puffs into the lungs 2 (two) times daily. 10.7 g 2    [DISCONTINUED] EScitalopram oxalate (LEXAPRO) 20 MG tablet Take 1 tablet (20 mg total) by mouth once daily. 90 tablet 1    [DISCONTINUED] pantoprazole (PROTONIX) 40 MG tablet Take 1 tablet (40 mg total) by mouth once daily. 90 tablet 0    [DISCONTINUED] rosuvastatin (CRESTOR) 20 MG tablet Take 1 tablet (20 mg total) by mouth every evening. 90 tablet 1    [DISCONTINUED] SPIRIVA WITH HANDIHALER 18 mcg  "inhalation capsule 1 capsule.      albuterol (PROVENTIL/VENTOLIN HFA) 90 mcg/actuation inhaler Proventil HFA 90 mcg/actuation aerosol inhaler  Inhale 2 puffs every 4 hours by inhalation route. 18 g 2    budesonide-glycopyr-formoterol (BREZTRI AEROSPHERE) 160-9-4.8 mcg/actuation HFAA Inhale 2 puffs into the lungs 2 (two) times daily. 10.7 g 2    EScitalopram oxalate (LEXAPRO) 20 MG tablet Take 1 tablet (20 mg total) by mouth once daily. 90 tablet 1    pantoprazole (PROTONIX) 40 MG tablet Take 1 tablet (40 mg total) by mouth once daily. 90 tablet 1    pregabalin (LYRICA) 75 MG capsule Take 75 mg by mouth 3 (three) times daily.      rosuvastatin (CRESTOR) 20 MG tablet Take 1 tablet (20 mg total) by mouth every evening. 90 tablet 1    [DISCONTINUED] diazePAM (VALIUM) 5 MG tablet TAKE 1 TABLET BY MOUTH ONCE DAILY 20 MINUTES PRIOR TO MRI MAY REPEAT DOSE       No facility-administered encounter medications on file as of 11/3/2023.       Allergies:  Review of patient's allergies indicates:  No Known Allergies    Health Maintenance:  Immunization History   Administered Date(s) Administered    COVID-19, MRNA, LN-S, PF (MODERNA FULL 0.5 ML DOSE) 02/26/2021, 03/26/2021      Health Maintenance   Topic Date Due    Hepatitis C Screening  Never done    TETANUS VACCINE  Never done    Colorectal Cancer Screening  Never done    Shingles Vaccine (1 of 2) Never done    Lipid Panel  04/12/2028        Physical Exam      Vital Signs  Temp: 97.7 °F (36.5 °C)  Temp Source: Oral  Pulse: 81  Resp: 16  SpO2: (!) 92 %  BP: 124/86  BP Location: Right arm  Patient Position: Sitting  Pain Score: 10-Worst pain ever  Pain Loc: Generalized  Height and Weight  Height: 5' 11" (180.3 cm)  Weight in (lb) to have BMI = 25: 178.9]    Physical Exam  Vitals and nursing note reviewed.   Constitutional:       Appearance: Normal appearance.   HENT:      Head: Normocephalic.      Right Ear: Hearing normal.      Left Ear: Hearing normal.      Nose: Nose normal. "   Eyes:      General: Lids are normal.      Conjunctiva/sclera: Conjunctivae normal.   Neck:      Thyroid: No thyromegaly.   Cardiovascular:      Rate and Rhythm: Normal rate and regular rhythm.      Heart sounds: Normal heart sounds.   Pulmonary:      Effort: Pulmonary effort is normal. Prolonged expiration present.      Breath sounds: Wheezing present.   Musculoskeletal:         General: Normal range of motion.      Cervical back: Normal range of motion and neck supple.   Skin:     General: Skin is warm and dry.   Neurological:      General: No focal deficit present.      Mental Status: He is alert and oriented to person, place, and time.      Gait: Gait is intact.          Laboratory:  CBC:  Recent Labs   Lab 08/24/21  1100 03/10/22  1400 04/12/23  1121   WBC 12.64 H 13.05 H 9.27   RBC 5.65 5.86 5.11   Hemoglobin 15.8 16.6 14.8   Hematocrit 50.6 51.8 47.6   Platelet Count 286 287 300   MCV 89.6 88.4 93.2   MCH 28.0 28.3 29.0   MCHC 31.2 L 32.0 31.1 L     CMP:  Recent Labs   Lab 03/10/22  1400 08/22/22  1418 11/16/22  1112 04/12/23  1121   Glucose 87 82   < > 80   Calcium 9.1 9.0   < > 9.8   Albumin 3.6 3.6  --  3.9   Total Protein 7.7 7.8  --  7.0   Sodium 135 L 138   < > 137   Potassium 3.9 3.9   < > 4.8   CO2 28 27   < > 33 H   Chloride 102 104   < > 104   BUN 8 7   < > 16   Alk Phos 118 H 115  --  83   ALT 28 34  --  32   AST 20 26  --  18   Bilirubin, Total 0.7 0.6  --  0.5    < > = values in this interval not displayed.     LIPIDS:  Recent Labs   Lab 08/24/21  1100 03/10/22  1400 11/16/22  1112 04/12/23  1121   TSH 1.390 1.810  --   --    HDL Cholesterol 51 44 48 57   Cholesterol 180 149 184 157   Triglycerides 88 90 78 59   LDL Calculated 111 87 120 88   Cholesterol/HDL Ratio (Risk Factor) 3.5 3.4 3.8 2.8   Non- 105 136 100     TSH:  Recent Labs   Lab 08/24/21  1100 03/10/22  1400   TSH 1.390 1.810     A1C:  Recent Labs   Lab 08/24/21  1100 03/10/22  1400 08/22/22  1418   Hemoglobin A1C 5.7 5.7 5.7        Assessment/Plan     Andrez Wen is a 58 y.o.male with:     1. Chronic obstructive pulmonary disease, unspecified COPD type  -     budesonide-glycopyr-formoterol (BREZTRI AEROSPHERE) 160-9-4.8 mcg/actuation HFAA; Inhale 2 puffs into the lungs 2 (two) times daily.  Dispense: 10.7 g; Refill: 2  -     albuterol (PROVENTIL/VENTOLIN HFA) 90 mcg/actuation inhaler; Proventil HFA 90 mcg/actuation aerosol inhaler  Inhale 2 puffs every 4 hours by inhalation route.  Dispense: 18 g; Refill: 2    2. Mixed hyperlipidemia  -     rosuvastatin (CRESTOR) 20 MG tablet; Take 1 tablet (20 mg total) by mouth every evening.  Dispense: 90 tablet; Refill: 1    3. Anxiety  -     EScitalopram oxalate (LEXAPRO) 20 MG tablet; Take 1 tablet (20 mg total) by mouth once daily.  Dispense: 90 tablet; Refill: 1    4. Gastroesophageal reflux disease without esophagitis  -     pantoprazole (PROTONIX) 40 MG tablet; Take 1 tablet (40 mg total) by mouth once daily.  Dispense: 90 tablet; Refill: 1       Encouraged to use Cpap nightly    Total time spent face-to-face and non-face-to-face coordinating care for this encounter was: 30 minutes     Chronic conditions status updated as per HPI.  Other than changes above, cont current medications and maintain follow up with specialists.  Return to clinic in 6 month(s) or sooner as needed.    Mee Kilpatrick, HILARIOP  [facility]

## 2023-12-12 ENCOUNTER — OFFICE VISIT (OUTPATIENT)
Dept: FAMILY MEDICINE | Facility: CLINIC | Age: 58
End: 2023-12-12
Payer: MEDICARE

## 2023-12-12 VITALS
WEIGHT: 230 LBS | BODY MASS INDEX: 32.2 KG/M2 | HEART RATE: 88 BPM | SYSTOLIC BLOOD PRESSURE: 138 MMHG | DIASTOLIC BLOOD PRESSURE: 88 MMHG | HEIGHT: 71 IN | TEMPERATURE: 98 F | RESPIRATION RATE: 19 BRPM | OXYGEN SATURATION: 95 %

## 2023-12-12 DIAGNOSIS — L98.9 DISORDER OF SKIN AND SUBCUTANEOUS TISSUE: ICD-10-CM

## 2023-12-12 DIAGNOSIS — J44.9 CHRONIC OBSTRUCTIVE PULMONARY DISEASE, UNSPECIFIED COPD TYPE: Primary | ICD-10-CM

## 2023-12-12 PROCEDURE — 4010F PR ACE/ARB THEARPY RXD/TAKEN: ICD-10-PCS | Mod: ,,, | Performed by: NURSE PRACTITIONER

## 2023-12-12 PROCEDURE — 3079F DIAST BP 80-89 MM HG: CPT | Mod: ,,, | Performed by: NURSE PRACTITIONER

## 2023-12-12 PROCEDURE — 99213 PR OFFICE/OUTPT VISIT, EST, LEVL III, 20-29 MIN: ICD-10-PCS | Mod: ,,, | Performed by: NURSE PRACTITIONER

## 2023-12-12 PROCEDURE — 3008F BODY MASS INDEX DOCD: CPT | Mod: ,,, | Performed by: NURSE PRACTITIONER

## 2023-12-12 PROCEDURE — 99213 OFFICE O/P EST LOW 20 MIN: CPT | Mod: ,,, | Performed by: NURSE PRACTITIONER

## 2023-12-12 PROCEDURE — 3075F PR MOST RECENT SYSTOLIC BLOOD PRESS GE 130-139MM HG: ICD-10-PCS | Mod: ,,, | Performed by: NURSE PRACTITIONER

## 2023-12-12 PROCEDURE — 3075F SYST BP GE 130 - 139MM HG: CPT | Mod: ,,, | Performed by: NURSE PRACTITIONER

## 2023-12-12 PROCEDURE — 3079F PR MOST RECENT DIASTOLIC BLOOD PRESSURE 80-89 MM HG: ICD-10-PCS | Mod: ,,, | Performed by: NURSE PRACTITIONER

## 2023-12-12 PROCEDURE — 3008F PR BODY MASS INDEX (BMI) DOCUMENTED: ICD-10-PCS | Mod: ,,, | Performed by: NURSE PRACTITIONER

## 2023-12-12 PROCEDURE — 4010F ACE/ARB THERAPY RXD/TAKEN: CPT | Mod: ,,, | Performed by: NURSE PRACTITIONER

## 2023-12-12 RX ORDER — BUDESONIDE, GLYCOPYRROLATE, AND FORMOTEROL FUMARATE 160; 9; 4.8 UG/1; UG/1; UG/1
2 AEROSOL, METERED RESPIRATORY (INHALATION) 2 TIMES DAILY
Qty: 10.7 G | Refills: 2 | Status: SHIPPED | OUTPATIENT
Start: 2023-12-12 | End: 2024-03-03

## 2023-12-12 RX ORDER — ALBUTEROL SULFATE 90 UG/1
AEROSOL, METERED RESPIRATORY (INHALATION)
Qty: 18 G | Refills: 2 | Status: SHIPPED | OUTPATIENT
Start: 2023-12-12 | End: 2024-03-11

## 2023-12-12 RX ORDER — ALBUTEROL SULFATE 0.83 MG/ML
2.5 SOLUTION RESPIRATORY (INHALATION) 2 TIMES DAILY
Qty: 90 ML | Refills: 2 | Status: SHIPPED | OUTPATIENT
Start: 2023-12-12

## 2023-12-12 RX ORDER — DOXYCYCLINE 100 MG/1
100 CAPSULE ORAL EVERY 12 HOURS
Qty: 14 CAPSULE | Refills: 0 | Status: SHIPPED | OUTPATIENT
Start: 2023-12-12 | End: 2024-01-23 | Stop reason: ALTCHOICE

## 2023-12-12 RX ORDER — METHYLPREDNISOLONE 4 MG/1
TABLET ORAL
Qty: 21 EACH | Refills: 0 | Status: SHIPPED | OUTPATIENT
Start: 2023-12-12 | End: 2024-01-02

## 2023-12-12 NOTE — PROGRESS NOTES
"[facility] Medicine    Chief Complaint      Chief Complaint   Patient presents with    Cyst     On the underside of his buttocks, rates pain 10/10. Hx of copd. Reports this past Saturday was having a coughing fit, thought he was going to "poop himself" so he beared down and coughed "real big" when he did, states he heard and felt a pop on the underside. Reports has been miserable ever since and states has not had a bm in 3 days. States every time he coughs, it affect the knot.       History of Present Illness      Andrez Wen is a 58 y.o. male. He  has a past medical history of Chronic heart failure and COPD (chronic obstructive pulmonary disease)., who presents today for f/u of his COPD and a tenderness "knot" in his buttocks area.  States he has been coughing a lot more lately and feels like something "popped" and now he has a tender spot in his buttocks.     He is also stating he has had more increased SOB than usual and coughing than normal.  Patient denies having seen a Pulmonologist before.     Past Medical History:  Past Medical History:   Diagnosis Date    Chronic heart failure     COPD (chronic obstructive pulmonary disease)        Past Surgical History:   has no past surgical history on file.    Social History:  Social History     Tobacco Use    Smoking status: Every Day     Current packs/day: 0.25     Average packs/day: 2.2 packs/day for 50.0 years (111.5 ttl pk-yrs)     Types: Cigarettes     Start date: 1974     Passive exposure: Current    Smokeless tobacco: Never    Tobacco comments:     Patient has tried quitting.    Substance Use Topics    Alcohol use: Yes     Comment: vodka or crown sometimes.    Drug use: Yes     Frequency: 7.0 times per week     Types: Marijuana     Comment: tries not to       I personally reviewed all past medical, surgical, and social.     Review of Systems   Constitutional:  Negative for chills, fatigue and fever.   HENT:  Negative for congestion and sore throat.    Eyes:  " Negative for visual disturbance.   Respiratory:  Positive for cough and shortness of breath.    Cardiovascular: Negative.    Gastrointestinal:  Negative for abdominal pain, blood in stool, constipation, diarrhea, nausea, rectal pain and vomiting.        Tenderness in buttocks area   Genitourinary:  Negative for difficulty urinating.   Musculoskeletal:  Negative for gait problem.   Skin: Negative.    Neurological:  Negative for dizziness, light-headedness and headaches.        Medications:  Outpatient Encounter Medications as of 12/12/2023   Medication Sig Dispense Refill    EScitalopram oxalate (LEXAPRO) 20 MG tablet Take 1 tablet (20 mg total) by mouth once daily. 90 tablet 1    gabapentin (NEURONTIN) 300 MG capsule Take 2 capsules (600 mg total) by mouth 2 (two) times daily. 360 capsule 1    HYDROcodone-acetaminophen (NORCO) 7.5-325 mg per tablet Take 1 tablet by mouth 2 (two) times daily as needed.      losartan (COZAAR) 50 MG tablet Take 1 tablet (50 mg total) by mouth once daily. 90 tablet 3    meloxicam (MOBIC) 7.5 MG tablet Take 1 tablet (7.5 mg total) by mouth once daily. 90 tablet 1    pantoprazole (PROTONIX) 40 MG tablet Take 1 tablet (40 mg total) by mouth once daily. 90 tablet 1    pregabalin (LYRICA) 75 MG capsule Take 75 mg by mouth 3 (three) times daily.      rosuvastatin (CRESTOR) 20 MG tablet Take 1 tablet (20 mg total) by mouth every evening. 90 tablet 1    tiZANidine (ZANAFLEX) 4 MG tablet Take 1 tablet (4 mg total) by mouth every 8 (eight) hours. 270 tablet 0    [DISCONTINUED] albuterol (PROVENTIL) 2.5 mg /3 mL (0.083 %) nebulizer solution Take 3 mLs (2.5 mg total) by nebulization 2 (two) times a day. 3 mL 5    [DISCONTINUED] albuterol (PROVENTIL/VENTOLIN HFA) 90 mcg/actuation inhaler Proventil HFA 90 mcg/actuation aerosol inhaler  Inhale 2 puffs every 4 hours by inhalation route. 18 g 2    [DISCONTINUED] budesonide-glycopyr-formoterol (BREZTRI AEROSPHERE) 160-9-4.8 mcg/actuation HFAA Inhale 2  "puffs into the lungs 2 (two) times daily. 10.7 g 2    albuterol (PROVENTIL) 2.5 mg /3 mL (0.083 %) nebulizer solution Take 3 mLs (2.5 mg total) by nebulization 2 (two) times a day. 90 mL 2    albuterol (PROVENTIL/VENTOLIN HFA) 90 mcg/actuation inhaler Proventil HFA 90 mcg/actuation aerosol inhaler  Inhale 2 puffs every 4 hours by inhalation route. 18 g 2    budesonide-glycopyr-formoterol (BREZTRI AEROSPHERE) 160-9-4.8 mcg/actuation HFAA Inhale 2 puffs into the lungs 2 (two) times daily. 10.7 g 2    doxycycline (VIBRAMYCIN) 100 MG Cap Take 1 capsule (100 mg total) by mouth every 12 (twelve) hours. 14 capsule 0    methylPREDNISolone (MEDROL DOSEPACK) 4 mg tablet use as directed 21 each 0     No facility-administered encounter medications on file as of 12/12/2023.       Allergies:  Review of patient's allergies indicates:  No Known Allergies    Health Maintenance:  Immunization History   Administered Date(s) Administered    COVID-19 Vaccine 02/26/2021    COVID-19, MRNA, LN-S, PF (MODERNA FULL 0.5 ML DOSE) 02/26/2021, 03/26/2021      Health Maintenance   Topic Date Due    Hepatitis C Screening  Never done    TETANUS VACCINE  Never done    Colorectal Cancer Screening  Never done    LDCT Lung Screen  Never done    Shingles Vaccine (1 of 2) Never done    High Dose Statin  12/14/2024    Lipid Panel  04/12/2028        Physical Exam      Vital Signs  Temp: 98.3 °F (36.8 °C)  Temp Source: Oral  Pulse: 88  Resp: 19  SpO2: 95 %  BP: 138/88  BP Location: Left arm  Patient Position: Sitting  Pain Score: 10-Worst pain ever  Height and Weight  Height: 5' 11" (180.3 cm)  Weight: 104.3 kg (230 lb)  BSA (Calculated - sq m): 2.29 sq meters  BMI (Calculated): 32.1  Weight in (lb) to have BMI = 25: 178.9]    Physical Exam  Vitals and nursing note reviewed.   Constitutional:       Appearance: Normal appearance.   HENT:      Head: Normocephalic.      Right Ear: Hearing normal.      Left Ear: Hearing normal.      Nose: Nose normal.   Eyes: "      General: Lids are normal.      Conjunctiva/sclera: Conjunctivae normal.   Neck:      Thyroid: No thyromegaly.   Cardiovascular:      Rate and Rhythm: Normal rate and regular rhythm.      Heart sounds: Normal heart sounds.   Pulmonary:      Effort: Pulmonary effort is normal.      Breath sounds: Normal breath sounds.   Genitourinary:     Rectum: Normal.          Comments: Lateral to the Rectum on the R buttocks-area of flat induration with tenderness without drainage  Musculoskeletal:         General: Normal range of motion.      Cervical back: Normal range of motion and neck supple.      Right lower leg: No edema.      Left lower leg: No edema.   Skin:     General: Skin is warm and dry.   Neurological:      General: No focal deficit present.      Mental Status: He is alert and oriented to person, place, and time.      Gait: Gait is intact.          Laboratory:  CBC:  Recent Labs   Lab 08/24/21  1100 03/10/22  1400 04/12/23  1121   WBC 12.64 H 13.05 H 9.27   RBC 5.65 5.86 5.11   Hemoglobin 15.8 16.6 14.8   Hematocrit 50.6 51.8 47.6   Platelet Count 286 287 300   MCV 89.6 88.4 93.2   MCH 28.0 28.3 29.0   MCHC 31.2 L 32.0 31.1 L     CMP:  Recent Labs   Lab 03/10/22  1400 08/22/22  1418 11/16/22  1112 04/12/23  1121   Glucose 87 82   < > 80   Calcium 9.1 9.0   < > 9.8   Albumin 3.6 3.6  --  3.9   Total Protein 7.7 7.8  --  7.0   Sodium 135 L 138   < > 137   Potassium 3.9 3.9   < > 4.8   CO2 28 27   < > 33 H   Chloride 102 104   < > 104   BUN 8 7   < > 16   Alk Phos 118 H 115  --  83   ALT 28 34  --  32   AST 20 26  --  18   Bilirubin, Total 0.7 0.6  --  0.5    < > = values in this interval not displayed.     LIPIDS:  Recent Labs   Lab 08/24/21  1100 03/10/22  1400 11/16/22  1112 04/12/23  1121   TSH 1.390 1.810  --   --    HDL Cholesterol 51 44 48 57   Cholesterol 180 149 184 157   Triglycerides 88 90 78 59   LDL Calculated 111 87 120 88   Cholesterol/HDL Ratio (Risk Factor) 3.5 3.4 3.8 2.8   Non- 105 136  100     TSH:  Recent Labs   Lab 08/24/21  1100 03/10/22  1400   TSH 1.390 1.810     A1C:  Recent Labs   Lab 08/24/21  1100 03/10/22  1400 08/22/22  1418   Hemoglobin A1C 5.7 5.7 5.7       Assessment/Plan     Andrez Wen is a 58 y.o.male with:     1. Chronic obstructive pulmonary disease, unspecified COPD type  -     albuterol (PROVENTIL/VENTOLIN HFA) 90 mcg/actuation inhaler; Proventil HFA 90 mcg/actuation aerosol inhaler  Inhale 2 puffs every 4 hours by inhalation route.  Dispense: 18 g; Refill: 2  -     budesonide-glycopyr-formoterol (BREZTRI AEROSPHERE) 160-9-4.8 mcg/actuation HFAA; Inhale 2 puffs into the lungs 2 (two) times daily.  Dispense: 10.7 g; Refill: 2  -     albuterol (PROVENTIL) 2.5 mg /3 mL (0.083 %) nebulizer solution; Take 3 mLs (2.5 mg total) by nebulization 2 (two) times a day.  Dispense: 90 mL; Refill: 2  -     doxycycline (VIBRAMYCIN) 100 MG Cap; Take 1 capsule (100 mg total) by mouth every 12 (twelve) hours.  Dispense: 14 capsule; Refill: 0  -     methylPREDNISolone (MEDROL DOSEPACK) 4 mg tablet; use as directed  Dispense: 21 each; Refill: 0  -     Ambulatory referral/consult to Pulmonology; Future; Expected date: 12/19/2023    2. Disorder of skin and subcutaneous tissue  -     doxycycline (VIBRAMYCIN) 100 MG Cap; Take 1 capsule (100 mg total) by mouth every 12 (twelve) hours.  Dispense: 14 capsule; Refill: 0         Total time spent face-to-face and non-face-to-face coordinating care for this encounter was: 20 minutes     Chronic conditions status updated as per HPI.  Other than changes above, cont current medications and maintain follow up with specialists.  Return to clinic in 1 month(s) or sooner as needed.    Mee Kilpatrick, HILARIOP  [facility]

## 2023-12-14 ENCOUNTER — OFFICE VISIT (OUTPATIENT)
Dept: PULMONOLOGY | Facility: CLINIC | Age: 58
End: 2023-12-14
Payer: MEDICARE

## 2023-12-14 VITALS
OXYGEN SATURATION: 94 % | HEIGHT: 71 IN | RESPIRATION RATE: 20 BRPM | DIASTOLIC BLOOD PRESSURE: 70 MMHG | HEART RATE: 81 BPM | WEIGHT: 230 LBS | BODY MASS INDEX: 32.2 KG/M2 | SYSTOLIC BLOOD PRESSURE: 110 MMHG

## 2023-12-14 DIAGNOSIS — R05.3 CHRONIC COUGH: Primary | ICD-10-CM

## 2023-12-14 DIAGNOSIS — F17.200 NICOTINE DEPENDENCE WITH CURRENT USE: ICD-10-CM

## 2023-12-14 DIAGNOSIS — F17.210 SMOKING GREATER THAN 40 PACK YEARS: ICD-10-CM

## 2023-12-14 DIAGNOSIS — J44.9 CHRONIC OBSTRUCTIVE PULMONARY DISEASE, UNSPECIFIED COPD TYPE: ICD-10-CM

## 2023-12-14 PROCEDURE — 4010F ACE/ARB THERAPY RXD/TAKEN: CPT | Mod: CPTII,,, | Performed by: STUDENT IN AN ORGANIZED HEALTH CARE EDUCATION/TRAINING PROGRAM

## 2023-12-14 PROCEDURE — 1159F PR MEDICATION LIST DOCUMENTED IN MEDICAL RECORD: ICD-10-PCS | Mod: CPTII,,, | Performed by: STUDENT IN AN ORGANIZED HEALTH CARE EDUCATION/TRAINING PROGRAM

## 2023-12-14 PROCEDURE — 3074F PR MOST RECENT SYSTOLIC BLOOD PRESSURE < 130 MM HG: ICD-10-PCS | Mod: CPTII,,, | Performed by: STUDENT IN AN ORGANIZED HEALTH CARE EDUCATION/TRAINING PROGRAM

## 2023-12-14 PROCEDURE — 1159F MED LIST DOCD IN RCRD: CPT | Mod: CPTII,,, | Performed by: STUDENT IN AN ORGANIZED HEALTH CARE EDUCATION/TRAINING PROGRAM

## 2023-12-14 PROCEDURE — G0296 VISIT TO DETERM LDCT ELIG: HCPCS | Mod: ,,, | Performed by: STUDENT IN AN ORGANIZED HEALTH CARE EDUCATION/TRAINING PROGRAM

## 2023-12-14 PROCEDURE — 99406 PR TOBACCO USE CESSATION INTERMEDIATE 3-10 MINUTES: ICD-10-PCS | Mod: S$PBB,,, | Performed by: STUDENT IN AN ORGANIZED HEALTH CARE EDUCATION/TRAINING PROGRAM

## 2023-12-14 PROCEDURE — 99204 OFFICE O/P NEW MOD 45 MIN: CPT | Mod: S$PBB,25,, | Performed by: STUDENT IN AN ORGANIZED HEALTH CARE EDUCATION/TRAINING PROGRAM

## 2023-12-14 PROCEDURE — 3078F DIAST BP <80 MM HG: CPT | Mod: CPTII,,, | Performed by: STUDENT IN AN ORGANIZED HEALTH CARE EDUCATION/TRAINING PROGRAM

## 2023-12-14 PROCEDURE — 99204 PR OFFICE/OUTPT VISIT, NEW, LEVL IV, 45-59 MIN: ICD-10-PCS | Mod: S$PBB,25,, | Performed by: STUDENT IN AN ORGANIZED HEALTH CARE EDUCATION/TRAINING PROGRAM

## 2023-12-14 PROCEDURE — 3074F SYST BP LT 130 MM HG: CPT | Mod: CPTII,,, | Performed by: STUDENT IN AN ORGANIZED HEALTH CARE EDUCATION/TRAINING PROGRAM

## 2023-12-14 PROCEDURE — 99406 BEHAV CHNG SMOKING 3-10 MIN: CPT | Mod: S$PBB,,, | Performed by: STUDENT IN AN ORGANIZED HEALTH CARE EDUCATION/TRAINING PROGRAM

## 2023-12-14 PROCEDURE — 3008F PR BODY MASS INDEX (BMI) DOCUMENTED: ICD-10-PCS | Mod: CPTII,,, | Performed by: STUDENT IN AN ORGANIZED HEALTH CARE EDUCATION/TRAINING PROGRAM

## 2023-12-14 PROCEDURE — 3008F BODY MASS INDEX DOCD: CPT | Mod: CPTII,,, | Performed by: STUDENT IN AN ORGANIZED HEALTH CARE EDUCATION/TRAINING PROGRAM

## 2023-12-14 PROCEDURE — 3078F PR MOST RECENT DIASTOLIC BLOOD PRESSURE < 80 MM HG: ICD-10-PCS | Mod: CPTII,,, | Performed by: STUDENT IN AN ORGANIZED HEALTH CARE EDUCATION/TRAINING PROGRAM

## 2023-12-14 PROCEDURE — 99215 OFFICE O/P EST HI 40 MIN: CPT | Mod: PBBFAC | Performed by: STUDENT IN AN ORGANIZED HEALTH CARE EDUCATION/TRAINING PROGRAM

## 2023-12-14 PROCEDURE — G0296 PR COUNSEL VISIT TO DETERMINE LOW DOSE CT SCAN ELIG: ICD-10-PCS | Mod: ,,, | Performed by: STUDENT IN AN ORGANIZED HEALTH CARE EDUCATION/TRAINING PROGRAM

## 2023-12-14 PROCEDURE — 4010F PR ACE/ARB THEARPY RXD/TAKEN: ICD-10-PCS | Mod: CPTII,,, | Performed by: STUDENT IN AN ORGANIZED HEALTH CARE EDUCATION/TRAINING PROGRAM

## 2023-12-14 NOTE — PROGRESS NOTES
[facility]  Pulmonology  NEW VISIT     Patient Name:  Andrez Wen  Primary Care Provider: Herminio Gee MD  Date of Service: 12/14/2023  Reason for Referral:  COPD      Chief Complaint:  Shortness of breath    SUBJECTIVE   HPI:  Andrez Wen is a 58 y.o. male with CHF, COPD and ongoing nicotine dependence who presents today upon referral with complaints of shortness of breath.     Bettye characterizes his shortness of breath as dyspnea with exertion.  He has a sole caretaker of his daughter who has functional innumerable neuromuscular limitations.  He has a cough which he attributes to his smoking.  He currently manages his dyspnea with albuterol with brief improvement in his shortness of breath. He denies any hospitalizations this year.  He is currently managed with respiratory inhaler which he uses twice daily and albuterol as needed.  He reports prior oxygen therapy.  He has a sleep apnea diagnosis but seldom uses his CPAP.  Review of records shows that he presented to his PCP on 11/2023 with complaints of    Past Medical History:   Diagnosis Date    Chronic heart failure     COPD (chronic obstructive pulmonary disease)        History reviewed. No pertinent surgical history.    Family History   Family history unknown: Yes        Social History     Socioeconomic History    Marital status:    Tobacco Use    Smoking status: Every Day     Current packs/day: 0.25     Average packs/day: 2.2 packs/day for 50.0 years (111.5 ttl pk-yrs)     Types: Cigarettes     Start date: 1974     Passive exposure: Current    Smokeless tobacco: Never    Tobacco comments:     Patient has tried quitting.    Substance and Sexual Activity    Alcohol use: Yes     Comment: vodka or crown sometimes.    Drug use: Yes     Frequency: 7.0 times per week     Types: Marijuana     Comment: tries not to    Sexual activity: Not Currently     Partners: Female       Social History     Social History Narrative    Not on  "file       Review of patient's allergies indicates:  No Known Allergies     Medications: Medications reviewed to include over the counter medications.    Review of Systems: A focused ROS was completed and found to be negative except for that mentioned above.      OBJECTIVE   PHYSICAL EXAM:  Vitals:    12/14/23 1340   BP: 110/70   BP Location: Left arm   Patient Position: Sitting   BP Method: Large (Manual)   Pulse: 81   Resp: 20   SpO2: (!) 94%   Weight: 104.3 kg (230 lb)   Height: 5' 11" (1.803 m)        GENERAL: NAD  HEENT: normocephalic, non-icteric conjunctivae, moist oral mucosa  RESPIRATORY: diminished lung sounds at but otherwise clear to auscultation, no wheezing, rales or rhonchi  CARDIOVASCULAR: Regular rate and rhythm, no murmurs rubs or gallops.  SKIN: no rash, jaundice, ecchymosis or ulcers  MUSCULOSKELETAL: No clubbing or cyanosis; no pedal edema  NEUROLOGIC: AO ×3, no gross deficits  PSYCH: Normal mood and affect    LABS:  Lab studies reviewed and notable for 04/2023 with normal WBC, serum eosinophils 290 (peak 680), normal LFTs, serum creatinine 0.91, CO2 33    IMAGING:  Imaging reviewed and notable for CXR 08/2022 with hyperinflation of lungs, poorly defined right paratracheal region, narrow mediastinum, no pleural effusion, no consolidation      LUNG FUNCTION TESTING:      SPIROMETRY/PFT:  10/2020 Pre Post   FVC 1.62/36 2.19/48 +BD   FEV1 0.74/23 0.98/30 +BD   FEV1/FVC 46 45     08/2020 Pre Post   FVC 2.47/51% 1.72/35%   FEV1 1.18/34% 0.89/25%   FEV1/FVC 71 52   There is a paradoxical decrease in spirometric volumes with bronchodilator administration.  Flow volume loop with scooping out of expiratory limb.  Technician comments include coughing throughout test.      ASSESSMENT & PLAN     1. Chronic cough  -     Complete PFT w/ bronchodilator; Future  -     Cancel: CT Chest Without Contrast; Future; Expected date: 12/14/2023  -     Stress test, pulmonary; Future    2. Chronic obstructive pulmonary " disease, unspecified COPD type  Assessment & Plan:  58-year-old male with ongoing nicotine dependence with an established diagnosis of COPD with imaging demonstrating hyperinflation presents to establish care for COPD.  Currently managed with a respiratory b.i.d. and albuterol as needed.  No exacerbations requiring hospitalizations in this past year.  Review of records from OSH including spirometry completed with PFTs 10/2020 demonstrating a severe obstruction with gas trapping.  Plan to repeat lung function testing given continued nicotine dependence.  Agree with respiratory and Iram p.r.n..  We will also obtain 6 minute walk test given prior history of hypoxia with borderline oxygen saturation on this assessment.    Orders:  -     Ambulatory referral/consult to Pulmonology  -     Complete PFT w/ bronchodilator; Future  -     Stress test, pulmonary; Future    3. Nicotine dependence with current use  Assessment & Plan:  Dangers of cigarette smoking were reviewed with patient in detail. Patient was Counseled for 3-10 minutes. Nicotine replacement options were discussed. Nicotine replacement was discussed- not prescribed per patient's request      4. Smoking greater than 40 pack years  Assessment & Plan:  Patient has a >60 pack-year smoking with ongoing nicotine dependence. We discussed lung cancer screening with low-dose CT including benefits (early diagnosis, reduced risk of death from lung cancer and decreased worry) and harms (false-positive findings, over-diagnosis, radiation exposure, increased anxiety) of screening and importance of adherence to the screening program with annual imaging at minimum. Patient is agreeable to lung cancer screening and to this end we will obtain a low-dose CT baseline.       Orders:  -     CT Chest Lung Screening Low Dose; Future; Expected date: 12/14/2023           Follow up in about 6 weeks (around 1/25/2024).      Case was discussed with patient; all questions were answered to  patient's satisfaction and patient verbalized understanding.     Sabine Cordova MD  Pulmonary Medicine  [facility]  Phone: [phone]

## 2023-12-15 PROBLEM — F17.210 SMOKING GREATER THAN 40 PACK YEARS: Status: ACTIVE | Noted: 2023-12-15

## 2023-12-15 PROBLEM — F17.200 NICOTINE DEPENDENCE WITH CURRENT USE: Status: ACTIVE | Noted: 2023-12-15

## 2023-12-15 PROBLEM — R05.3 CHRONIC COUGH: Status: ACTIVE | Noted: 2023-12-15

## 2023-12-15 NOTE — ASSESSMENT & PLAN NOTE
Patient has a >60 pack-year smoking with ongoing nicotine dependence. We discussed lung cancer screening with low-dose CT including benefits (early diagnosis, reduced risk of death from lung cancer and decreased worry) and harms (false-positive findings, over-diagnosis, radiation exposure, increased anxiety) of screening and importance of adherence to the screening program with annual imaging at minimum. Patient is agreeable to lung cancer screening and to this end we will obtain a low-dose CT baseline.

## 2023-12-15 NOTE — ASSESSMENT & PLAN NOTE
58-year-old male with ongoing nicotine dependence with an established diagnosis of COPD with imaging demonstrating hyperinflation presents to establish care for COPD.  Currently managed with a respiratory b.i.d. and albuterol as needed.  No exacerbations requiring hospitalizations in this past year.  Review of records from OSH including spirometry completed with PFTs 10/2020 demonstrating a severe obstruction with gas trapping.  Plan to repeat lung function testing given continued nicotine dependence.  Agree with respiratory and Iram p.r.n..  We will also obtain 6 minute walk test given prior history of hypoxia with borderline oxygen saturation on this assessment.

## 2023-12-20 ENCOUNTER — HOSPITAL ENCOUNTER (EMERGENCY)
Facility: HOSPITAL | Age: 58
Discharge: HOME | End: 2023-12-20
Attending: FAMILY MEDICINE
Payer: MEDICARE

## 2023-12-20 VITALS
WEIGHT: 230 LBS | OXYGEN SATURATION: 88 % | RESPIRATION RATE: 24 BRPM | TEMPERATURE: 99 F | DIASTOLIC BLOOD PRESSURE: 92 MMHG | HEIGHT: 71 IN | BODY MASS INDEX: 32.2 KG/M2 | SYSTOLIC BLOOD PRESSURE: 164 MMHG | HEART RATE: 122 BPM

## 2023-12-20 DIAGNOSIS — L02.91 ABSCESS: Primary | ICD-10-CM

## 2023-12-20 DIAGNOSIS — J44.9 CHRONIC OBSTRUCTIVE PULMONARY DISEASE, UNSPECIFIED COPD TYPE: ICD-10-CM

## 2023-12-20 DIAGNOSIS — F41.9 ANXIETY: ICD-10-CM

## 2023-12-20 PROCEDURE — 99284 PR EMERGENCY DEPT VISIT,LEVEL IV: ICD-10-PCS | Mod: ,,, | Performed by: FAMILY MEDICINE

## 2023-12-20 PROCEDURE — 63600175 PHARM REV CODE 636 W HCPCS: Performed by: FAMILY MEDICINE

## 2023-12-20 PROCEDURE — 99284 EMERGENCY DEPT VISIT MOD MDM: CPT | Mod: ,,, | Performed by: FAMILY MEDICINE

## 2023-12-20 PROCEDURE — 99284 EMERGENCY DEPT VISIT MOD MDM: CPT

## 2023-12-20 PROCEDURE — 96372 THER/PROPH/DIAG INJ SC/IM: CPT | Performed by: FAMILY MEDICINE

## 2023-12-20 RX ORDER — LORAZEPAM 2 MG/ML
2 INJECTION INTRAMUSCULAR
Status: COMPLETED | OUTPATIENT
Start: 2023-12-20 | End: 2023-12-20

## 2023-12-20 RX ORDER — HYDROXYZINE PAMOATE 25 MG/1
25 CAPSULE ORAL EVERY 8 HOURS PRN
Qty: 40 CAPSULE | Refills: 2 | Status: SHIPPED | OUTPATIENT
Start: 2023-12-20 | End: 2024-01-29

## 2023-12-20 RX ORDER — CLINDAMYCIN HYDROCHLORIDE 300 MG/1
300 CAPSULE ORAL EVERY 6 HOURS
Qty: 30 CAPSULE | Refills: 0 | Status: SHIPPED | OUTPATIENT
Start: 2023-12-20 | End: 2023-12-28

## 2023-12-20 RX ORDER — NAPROXEN 500 MG/1
500 TABLET ORAL 2 TIMES DAILY WITH MEALS
Qty: 60 TABLET | Refills: 0 | Status: SHIPPED | OUTPATIENT
Start: 2023-12-20 | End: 2024-01-15

## 2023-12-20 RX ORDER — METHYLPREDNISOLONE ACETATE 80 MG/ML
40 INJECTION, SUSPENSION INTRA-ARTICULAR; INTRALESIONAL; INTRAMUSCULAR; SOFT TISSUE
Status: COMPLETED | OUTPATIENT
Start: 2023-12-20 | End: 2023-12-20

## 2023-12-20 RX ORDER — DEXAMETHASONE SODIUM PHOSPHATE 4 MG/ML
4 INJECTION, SOLUTION INTRA-ARTICULAR; INTRALESIONAL; INTRAMUSCULAR; INTRAVENOUS; SOFT TISSUE
Status: COMPLETED | OUTPATIENT
Start: 2023-12-20 | End: 2023-12-20

## 2023-12-20 RX ORDER — CEFTRIAXONE 1 G/1
1 INJECTION, POWDER, FOR SOLUTION INTRAMUSCULAR; INTRAVENOUS
Status: COMPLETED | OUTPATIENT
Start: 2023-12-20 | End: 2023-12-20

## 2023-12-20 RX ADMIN — DEXAMETHASONE SODIUM PHOSPHATE 4 MG: 4 INJECTION, SOLUTION INTRA-ARTICULAR; INTRALESIONAL; INTRAMUSCULAR; INTRAVENOUS; SOFT TISSUE at 07:12

## 2023-12-20 RX ADMIN — CEFTRIAXONE 1 G: 1 INJECTION, POWDER, FOR SOLUTION INTRAMUSCULAR; INTRAVENOUS at 07:12

## 2023-12-20 RX ADMIN — METHYLPREDNISOLONE ACETATE 40 MG: 80 INJECTION, SUSPENSION INTRA-ARTICULAR; INTRALESIONAL; INTRAMUSCULAR; SOFT TISSUE at 07:12

## 2023-12-20 RX ADMIN — LORAZEPAM 2 MG: 2 INJECTION INTRAMUSCULAR; INTRAVENOUS at 07:12

## 2023-12-20 NOTE — ED PROVIDER NOTES
Encounter Date: 12/20/2023       History     Chief Complaint   Patient presents with    Wound Infection     Patient comes in with wound in his perineal area started about week ago.  Opened up 2 days ago then again yesterday began draining yellow purulent discharge.  Complain of pain and nausea        Review of patient's allergies indicates:  No Known Allergies  Past Medical History:   Diagnosis Date    Chronic heart failure     COPD (chronic obstructive pulmonary disease)      No past surgical history on file.  Family History   Family history unknown: Yes     Social History     Tobacco Use    Smoking status: Every Day     Current packs/day: 0.25     Average packs/day: 2.2 packs/day for 50.0 years (111.5 ttl pk-yrs)     Types: Cigarettes     Start date: 1974     Passive exposure: Current    Smokeless tobacco: Never    Tobacco comments:     Patient has tried quitting.    Substance Use Topics    Alcohol use: Yes     Comment: vodka or crown sometimes.    Drug use: Yes     Frequency: 7.0 times per week     Types: Marijuana     Comment: tries not to     Review of Systems   Constitutional:  Positive for fatigue. Negative for fever.   HENT: Negative.  Negative for sore throat.    Eyes: Negative.    Respiratory: Negative.  Negative for shortness of breath.    Cardiovascular: Negative.  Negative for chest pain.   Gastrointestinal: Negative.  Negative for nausea.   Endocrine: Negative.    Genitourinary: Negative.  Negative for dysuria.   Musculoskeletal: Negative.  Negative for back pain.   Skin: Negative.  Negative for rash.   Allergic/Immunologic: Negative.    Neurological: Negative.  Negative for weakness.   Hematological: Negative.  Does not bruise/bleed easily.   Psychiatric/Behavioral: Negative.         Physical Exam     Initial Vitals [12/20/23 0642]   BP Pulse Resp Temp SpO2   (!) 164/92 (!) 122 (!) 24 98.5 °F (36.9 °C) (!) 88 %      MAP       --         Physical Exam    Constitutional: He appears well-developed and  well-nourished.   HENT:   Head: Normocephalic and atraumatic.   Right Ear: External ear normal.   Left Ear: External ear normal.   Nose: Nose normal.   Mouth/Throat: Oropharynx is clear and moist.   Eyes: Conjunctivae and EOM are normal. Pupils are equal, round, and reactive to light.   Neck: Neck supple.   Normal range of motion.  Cardiovascular:  Normal rate, regular rhythm, normal heart sounds and intact distal pulses.           Pulmonary/Chest: Breath sounds normal.   Patient with expiratory wheezing    Abdominal: Abdomen is soft. Bowel sounds are normal.   Genitourinary:    Prostate and penis normal.      Genitourinary Comments: Patient with a draining abscess on the right buttocks---     Musculoskeletal:         General: Normal range of motion.      Cervical back: Normal range of motion and neck supple.     Neurological: He is alert and oriented to person, place, and time. He has normal strength and normal reflexes.   Skin: Skin is warm and dry.   Psychiatric: He has a normal mood and affect. His behavior is normal. Judgment and thought content normal.         Medical Screening Exam   See Full Note    ED Course   Procedures  Labs Reviewed - No data to display       Imaging Results    None          Medications   cefTRIAXone injection 1 g (has no administration in time range)   LORazepam injection 2 mg (has no administration in time range)   methylPREDNISolone acetate injection 40 mg (has no administration in time range)   dexAMETHasone injection 4 mg (has no administration in time range)     Medical Decision Making                        Medical Decision Making:   Initial Assessment:   Patient with COPD and an abscess to the right buttocks with drainage.  Differential Diagnosis:   COPD, abscess to the right buttocks, anxiety..............,.,.,..  ED Management:  Clindamycin 300 1 p.o. 3 times a day Number 30  ---Decadron for Depo-Medrol 40, Rocephin 1 g             Clinical Impression:   Final  diagnoses:  [L02.91] Abscess (Primary)  [J44.9] Chronic obstructive pulmonary disease, unspecified COPD type  [F41.9] Anxiety        ED Disposition Condition    Discharge Stable          ED Prescriptions       Medication Sig Dispense Start Date End Date Auth. Provider    hydrOXYzine pamoate (VISTARIL) 25 MG Cap Take 1 capsule (25 mg total) by mouth every 8 (eight) hours as needed. 40 capsule 12/20/2023 1/29/2024 Sergey Sutherland,     clindamycin (CLEOCIN) 300 MG capsule Take 1 capsule (300 mg total) by mouth every 6 (six) hours. for 30 doses 30 capsule 12/20/2023 12/28/2023 Sergey Sutherland,     naproxen (NAPROSYN) 500 MG tablet Take 1 tablet (500 mg total) by mouth 2 (two) times daily with meals. 60 tablet 12/20/2023 -- Sergey Sutherland, DO          Follow-up Information    None          Sergey Sutherland,   12/20/23 0715

## 2023-12-20 NOTE — ED TRIAGE NOTES
Pt reports infection to perineum starting a week ago, states it opened up 2 days ago, then again yesterday began draining yellow puss. C/o pain, and nausea.

## 2023-12-28 ENCOUNTER — HOSPITAL ENCOUNTER (EMERGENCY)
Facility: HOSPITAL | Age: 58
Discharge: HOME | End: 2023-12-28
Payer: MEDICARE

## 2023-12-28 VITALS
DIASTOLIC BLOOD PRESSURE: 87 MMHG | TEMPERATURE: 98 F | SYSTOLIC BLOOD PRESSURE: 161 MMHG | WEIGHT: 229.94 LBS | HEIGHT: 71 IN | HEART RATE: 97 BPM | OXYGEN SATURATION: 93 % | RESPIRATION RATE: 20 BRPM | BODY MASS INDEX: 32.19 KG/M2

## 2023-12-28 DIAGNOSIS — K52.1 ANTIBIOTIC-ASSOCIATED DIARRHEA: Primary | ICD-10-CM

## 2023-12-28 DIAGNOSIS — T36.95XA ANTIBIOTIC-ASSOCIATED DIARRHEA: Primary | ICD-10-CM

## 2023-12-28 LAB
ALBUMIN SERPL BCP-MCNC: 2.9 G/DL (ref 3.5–5)
ALBUMIN/GLOB SERPL: 0.6 {RATIO}
ALP SERPL-CCNC: 62 U/L (ref 45–115)
ALT SERPL W P-5'-P-CCNC: 50 U/L (ref 16–61)
ANION GAP SERPL CALCULATED.3IONS-SCNC: 10 MMOL/L (ref 7–16)
AST SERPL W P-5'-P-CCNC: 48 U/L (ref 15–37)
BASOPHILS # BLD AUTO: 0.05 K/UL (ref 0–0.2)
BASOPHILS NFR BLD AUTO: 0.6 % (ref 0–1)
BILIRUB SERPL-MCNC: 1 MG/DL (ref ?–1.2)
BILIRUB UR QL STRIP: NEGATIVE
BUN SERPL-MCNC: 8 MG/DL (ref 7–18)
BUN/CREAT SERPL: 10 (ref 6–20)
CALCIUM SERPL-MCNC: 9.2 MG/DL (ref 8.5–10.1)
CHLORIDE SERPL-SCNC: 102 MMOL/L (ref 98–107)
CLARITY UR: CLEAR
CO2 SERPL-SCNC: 32 MMOL/L (ref 21–32)
COLOR UR: YELLOW
CREAT SERPL-MCNC: 0.77 MG/DL (ref 0.7–1.3)
DIFFERENTIAL METHOD BLD: ABNORMAL
EGFR (NO RACE VARIABLE) (RUSH/TITUS): 102 ML/MIN/1.73M2
EOSINOPHIL # BLD AUTO: 0.09 K/UL (ref 0–0.5)
EOSINOPHIL NFR BLD AUTO: 1.2 % (ref 1–4)
ERYTHROCYTE [DISTWIDTH] IN BLOOD BY AUTOMATED COUNT: 14.1 % (ref 11.5–14.5)
GLOBULIN SER-MCNC: 4.8 G/DL (ref 2–4)
GLUCOSE SERPL-MCNC: 97 MG/DL (ref 74–106)
GLUCOSE UR STRIP-MCNC: NORMAL MG/DL
HCT VFR BLD AUTO: 46.8 % (ref 40–54)
HGB BLD-MCNC: 15.2 G/DL (ref 13.5–18)
IMM GRANULOCYTES # BLD AUTO: 0.04 K/UL (ref 0–0.04)
IMM GRANULOCYTES NFR BLD: 0.5 % (ref 0–0.4)
KETONES UR STRIP-SCNC: NEGATIVE MG/DL
LEUKOCYTE ESTERASE UR QL STRIP: NEGATIVE
LYMPHOCYTES # BLD AUTO: 2.64 K/UL (ref 1–4.8)
LYMPHOCYTES NFR BLD AUTO: 34.2 % (ref 27–41)
MCH RBC QN AUTO: 29.1 PG (ref 27–31)
MCHC RBC AUTO-ENTMCNC: 32.5 G/DL (ref 32–36)
MCV RBC AUTO: 89.5 FL (ref 80–96)
MONOCYTES # BLD AUTO: 0.53 K/UL (ref 0–0.8)
MONOCYTES NFR BLD AUTO: 6.9 % (ref 2–6)
MPC BLD CALC-MCNC: 10.1 FL (ref 9.4–12.4)
NEUTROPHILS # BLD AUTO: 4.38 K/UL (ref 1.8–7.7)
NEUTROPHILS NFR BLD AUTO: 56.6 % (ref 53–65)
NITRITE UR QL STRIP: NEGATIVE
NRBC # BLD AUTO: 0 X10E3/UL
NRBC, AUTO (.00): 0 %
PH UR STRIP: 5.5 PH UNITS
PLATELET # BLD AUTO: 338 K/UL (ref 150–400)
POTASSIUM SERPL-SCNC: 5.1 MMOL/L (ref 3.5–5.1)
PROT SERPL-MCNC: 7.7 G/DL (ref 6.4–8.2)
PROT UR QL STRIP: 10
RBC # BLD AUTO: 5.23 M/UL (ref 4.6–6.2)
RBC # UR STRIP: NEGATIVE /UL
SODIUM SERPL-SCNC: 139 MMOL/L (ref 136–145)
SP GR UR STRIP: 1.02
UROBILINOGEN UR STRIP-ACNC: NORMAL MG/DL
WBC # BLD AUTO: 7.73 K/UL (ref 4.5–11)

## 2023-12-28 PROCEDURE — 80053 COMPREHEN METABOLIC PANEL: CPT | Performed by: NURSE PRACTITIONER

## 2023-12-28 PROCEDURE — 25000003 PHARM REV CODE 250: Performed by: NURSE PRACTITIONER

## 2023-12-28 PROCEDURE — 81003 URINALYSIS AUTO W/O SCOPE: CPT | Performed by: NURSE PRACTITIONER

## 2023-12-28 PROCEDURE — 99284 EMERGENCY DEPT VISIT MOD MDM: CPT | Mod: ,,, | Performed by: NURSE PRACTITIONER

## 2023-12-28 PROCEDURE — 85025 COMPLETE CBC W/AUTO DIFF WBC: CPT | Performed by: NURSE PRACTITIONER

## 2023-12-28 PROCEDURE — 99284 EMERGENCY DEPT VISIT MOD MDM: CPT

## 2023-12-28 RX ORDER — IBUPROFEN 400 MG/1
800 TABLET ORAL
Status: COMPLETED | OUTPATIENT
Start: 2023-12-28 | End: 2023-12-28

## 2023-12-28 RX ORDER — LOPERAMIDE HYDROCHLORIDE 2 MG/1
2 CAPSULE ORAL
Qty: 30 CAPSULE | Refills: 0 | Status: SHIPPED | OUTPATIENT
Start: 2023-12-28 | End: 2024-01-07

## 2023-12-28 RX ORDER — DIPHENOXYLATE HYDROCHLORIDE AND ATROPINE SULFATE 2.5; .025 MG/1; MG/1
2 TABLET ORAL
Status: COMPLETED | OUTPATIENT
Start: 2023-12-28 | End: 2023-12-28

## 2023-12-28 RX ORDER — HYDROCORTISONE ACETATE 25 MG/1
25 SUPPOSITORY RECTAL 2 TIMES DAILY
Qty: 20 SUPPOSITORY | Refills: 0 | Status: SHIPPED | OUTPATIENT
Start: 2023-12-28 | End: 2024-01-07

## 2023-12-28 RX ADMIN — DIPHENOXYLATE HYDROCHLORIDE AND ATROPINE SULFATE 2 TABLET: 2.5; .025 TABLET ORAL at 03:12

## 2023-12-28 RX ADMIN — IBUPROFEN 800 MG: 400 TABLET, FILM COATED ORAL at 04:12

## 2023-12-28 NOTE — ED PROVIDER NOTES
"Encounter Date: 12/28/2023       History     Chief Complaint   Patient presents with    Abscess     57 y/o WM presents to the emergency department with c/o diarrhea. He states that he is unable to control his bowels but after further questioning he can not hold it or make it to the restroom. He c/o abd pain. He denies nausea or vomiting. He denies fever or chills. He states he is having multiple BM a day. He denies melena or hematochezia. He states it is brown and watery. He also c/o having had an abscess that has since stopped draining and "needs it checked". He has had nothing for his symptoms. There are no known exacerbating or remitting factors.    The history is provided by the patient.     Review of patient's allergies indicates:  No Known Allergies  Past Medical History:   Diagnosis Date    Chronic heart failure     COPD (chronic obstructive pulmonary disease)      History reviewed. No pertinent surgical history.  Family History   Family history unknown: Yes     Social History     Tobacco Use    Smoking status: Every Day     Current packs/day: 0.25     Average packs/day: 2.2 packs/day for 50.0 years (111.5 ttl pk-yrs)     Types: Cigarettes     Start date: 1974     Passive exposure: Current    Smokeless tobacco: Never    Tobacco comments:     Patient has tried quitting.    Substance Use Topics    Alcohol use: Yes     Comment: vodka or crown sometimes.    Drug use: Yes     Frequency: 7.0 times per week     Types: Marijuana     Comment: tries not to     Review of Systems   All other systems reviewed and are negative.      Physical Exam     Initial Vitals [12/28/23 1410]   BP Pulse Resp Temp SpO2   (!) 161/87 97 20 97.9 °F (36.6 °C) (!) 93 %      MAP       --         Physical Exam    Constitutional: He appears well-developed and well-nourished. He is active and cooperative.   Cardiovascular:  Normal rate, regular rhythm, normal heart sounds and normal pulses.           Pulmonary/Chest: Effort normal and breath " sounds normal.   Abdominal: Abdomen is soft. Bowel sounds are normal. There is no abdominal tenderness.   Genitourinary:    Rectum normal.   Rectum:      No rectal mass, anal fissure, tenderness, external hemorrhoid, internal hemorrhoid or abnormal anal tone.      Genitourinary Comments: Exam performed with RN at bedside.   Abscess has healed.        Neurological: He is alert and oriented to person, place, and time.   Skin: Skin is warm, dry and intact. Capillary refill takes less than 2 seconds.   Psychiatric: He has a normal mood and affect. His speech is normal and behavior is normal. Judgment and thought content normal. Cognition and memory are normal.         Medical Screening Exam   See Full Note    ED Course   Procedures  Labs Reviewed   URINALYSIS, REFLEX TO URINE CULTURE - Abnormal; Notable for the following components:       Result Value    Protein, UA 10 (*)     All other components within normal limits   COMPREHENSIVE METABOLIC PANEL - Abnormal; Notable for the following components:    Albumin 2.9 (*)     Globulin 4.8 (*)     AST 48 (*)     All other components within normal limits   CBC WITH DIFFERENTIAL - Abnormal; Notable for the following components:    Monocytes % 6.9 (*)     Immature Granulocytes % 0.5 (*)     All other components within normal limits   CULTURE, STOOL   FECAL LEUKOCYTES   OVA AND PARASITE EXAM   CLOSTRIDIOIDES DIFFICILE TOXIN/ANTIGEN WITH REFLEX TO PCR   CBC W/ AUTO DIFFERENTIAL    Narrative:     The following orders were created for panel order CBC auto differential.  Procedure                               Abnormality         Status                     ---------                               -----------         ------                     CBC with Differential[[id number]]       Abnormal            Final result                 Please view results for these tests on the individual orders.          Imaging Results              X-Ray KUB (Final result)  Result time 12/28/23 14:42:08  "     Final result by Ricky Lopes MD (12/28/23 14:42:08)                   Impression:      No acute findings.      Electronically signed by: Ricky Lopes  Date:    12/28/2023  Time:    14:42               Narrative:    EXAMINATION:  XR KUB    CLINICAL HISTORY:  diarrhea;    TECHNIQUE:  Single AP supine view of the abdomen (KUB) was performed    COMPARISON:  None    FINDINGS:  No bowel obstruction.  No significant stool burden.  No abnormal calcifications.                                       Medications   ibuprofen tablet 800 mg (has no administration in time range)   diphenoxylate-atropine 2.5-0.025 mg per tablet 2 tablet (2 tablets Oral Given 12/28/23 4348)     Medical Decision Making  57 y/o WM presents to the emergency department with c/o diarrhea. He states that he is unable to control his bowels but after further questioning he can not hold it or make it to the restroom. He c/o abd pain. He denies nausea or vomiting. He denies fever or chills. He states he is having multiple BM a day. He denies melena or hematochezia. He states it is brown and watery. He also c/o having had an abscess that has since stopped draining and "needs it checked". He has had nothing for his symptoms. There are no known exacerbating or remitting factors.    Problems Addressed:  Antibiotic-associated diarrhea:     Details: Pt recently placed on abx for abscess. The abscess has healed. Suspect diarrhea r/t this. No evidence or signs to raise suspicion for Cdiff. Stool studies ordered. Lomotil given. Rx for Imodium and anusol. Counseled on use and supportive measures. Follow up instructions given. Warning s/s discussed and return precautions given; the patient has v/u.    Amount and/or Complexity of Data Reviewed  Labs: ordered. Decision-making details documented in ED Course.  Radiology: ordered. Decision-making details documented in ED Course.    Risk  OTC drugs.  Prescription drug management.               ED Course as of 12/28/23 " 1557   Thu Dec 28, 2023   1459 X-Ray KUB  No acute findings.      [BM]   1532 WBC: 7.73 [BM]   1532 Hemoglobin: 15.2 [BM]   1532 Hematocrit: 46.8 [BM]   1532 Sodium: 139 [BM]   1532 Potassium: 5.1 [BM]   1532 BUN: 8 [BM]   1532 Creatinine: 0.77 [BM]   1532 BILIRUBIN TOTAL: 1.0 [BM]   1532 ALP: 62 [BM]   1532 ALT: 50 [BM]   1532 AST(!): 48 [BM]      ED Course User Index  [BM] Shauna Mckee FNP                           Clinical Impression:   Final diagnoses:  [K52.1, T36.95XA] Antibiotic-associated diarrhea (Primary)        ED Disposition Condition    Discharge Stable          ED Prescriptions       Medication Sig Dispense Start Date End Date Auth. Provider    loperamide (IMODIUM) 2 mg capsule Take 1 capsule (2 mg total) by mouth as needed for Diarrhea. Take one after each loose stool not to exceed 8 tablets a day 30 capsule 12/28/2023 1/7/2024 Shauna Mckee FNP    hydrocortisone (ANUSOL-HC) 25 mg suppository Place 1 suppository (25 mg total) rectally 2 (two) times daily. for 10 days 20 suppository 12/28/2023 1/7/2024 Shauna Mckee FNP          Follow-up Information       Follow up With Specialties Details Why Contact Info    Hermiino Gee MD Family Medicine  As needed [address] [facility].  [facility] [place] [address] [phone]               Shauna Mckee FNP  12/28/23 3901

## 2023-12-28 NOTE — ED NOTES
[employer] paperwork completed for patient transportation.     Confirmation #: [id number]  Submission Date / Time: Dec 28, 2023 3:48 PM

## 2023-12-28 NOTE — ED TRIAGE NOTES
Pt c/o abscess that started to drain a few weeks ago but now has rectal pain and is incontinent of bowel.

## 2023-12-28 NOTE — DISCHARGE INSTRUCTIONS
Use prescriptions as directed. Ensure you are drinking plenty of fluids. Eat plenty of fiber, fresh fruits and vegetables. Get some probiotics or yogurt to eat. Follow up with your primary care provider as needed or if no improvement. Return to the ED for worsening signs and symptoms or otherwise as needed.

## 2024-01-15 RX ORDER — NAPROXEN 500 MG/1
500 TABLET ORAL 2 TIMES DAILY WITH MEALS
Qty: 60 TABLET | Refills: 0 | Status: SHIPPED | OUTPATIENT
Start: 2024-01-15 | End: 2024-02-08

## 2024-01-23 ENCOUNTER — HOSPITAL ENCOUNTER (OUTPATIENT)
Dept: RADIOLOGY | Facility: HOSPITAL | Age: 59
Discharge: HOME | End: 2024-01-23
Attending: STUDENT IN AN ORGANIZED HEALTH CARE EDUCATION/TRAINING PROGRAM
Payer: MEDICARE

## 2024-01-23 ENCOUNTER — CLINICAL SUPPORT (OUTPATIENT)
Dept: PULMONOLOGY | Facility: HOSPITAL | Age: 59
End: 2024-01-23
Attending: FAMILY MEDICINE
Payer: MEDICARE

## 2024-01-23 ENCOUNTER — CLINICAL SUPPORT (OUTPATIENT)
Dept: PULMONOLOGY | Facility: HOSPITAL | Age: 59
End: 2024-01-23
Attending: INTERNAL MEDICINE
Payer: MEDICARE

## 2024-01-23 ENCOUNTER — OFFICE VISIT (OUTPATIENT)
Dept: PULMONOLOGY | Facility: CLINIC | Age: 59
End: 2024-01-23
Payer: MEDICARE

## 2024-01-23 VITALS
HEIGHT: 71 IN | BODY MASS INDEX: 32.95 KG/M2 | HEIGHT: 71 IN | SYSTOLIC BLOOD PRESSURE: 138 MMHG | DIASTOLIC BLOOD PRESSURE: 70 MMHG | WEIGHT: 230 LBS | WEIGHT: 235.38 LBS | HEART RATE: 88 BPM | RESPIRATION RATE: 18 BRPM | OXYGEN SATURATION: 92 % | WEIGHT: 230 LBS | HEIGHT: 71 IN | OXYGEN SATURATION: 90 % | BODY MASS INDEX: 32.2 KG/M2 | BODY MASS INDEX: 32.2 KG/M2

## 2024-01-23 DIAGNOSIS — J44.9 CHRONIC OBSTRUCTIVE PULMONARY DISEASE, UNSPECIFIED COPD TYPE: ICD-10-CM

## 2024-01-23 DIAGNOSIS — F17.210 SMOKING GREATER THAN 40 PACK YEARS: ICD-10-CM

## 2024-01-23 DIAGNOSIS — J44.89 ASTHMA-COPD OVERLAP SYNDROME: Primary | ICD-10-CM

## 2024-01-23 DIAGNOSIS — R05.3 CHRONIC COUGH: ICD-10-CM

## 2024-01-23 DIAGNOSIS — F17.200 NICOTINE DEPENDENCE WITH CURRENT USE: ICD-10-CM

## 2024-01-23 PROCEDURE — 3078F DIAST BP <80 MM HG: CPT | Mod: CPTII,,, | Performed by: STUDENT IN AN ORGANIZED HEALTH CARE EDUCATION/TRAINING PROGRAM

## 2024-01-23 PROCEDURE — 94060 EVALUATION OF WHEEZING: CPT | Mod: 26,59,, | Performed by: STUDENT IN AN ORGANIZED HEALTH CARE EDUCATION/TRAINING PROGRAM

## 2024-01-23 PROCEDURE — 94729 DIFFUSING CAPACITY: CPT | Mod: 26,,, | Performed by: STUDENT IN AN ORGANIZED HEALTH CARE EDUCATION/TRAINING PROGRAM

## 2024-01-23 PROCEDURE — 94729 DIFFUSING CAPACITY: CPT

## 2024-01-23 PROCEDURE — 3008F BODY MASS INDEX DOCD: CPT | Mod: CPTII,,, | Performed by: STUDENT IN AN ORGANIZED HEALTH CARE EDUCATION/TRAINING PROGRAM

## 2024-01-23 PROCEDURE — 99215 OFFICE O/P EST HI 40 MIN: CPT | Mod: PBBFAC,25 | Performed by: STUDENT IN AN ORGANIZED HEALTH CARE EDUCATION/TRAINING PROGRAM

## 2024-01-23 PROCEDURE — 94726 PLETHYSMOGRAPHY LUNG VOLUMES: CPT | Mod: 26,,, | Performed by: STUDENT IN AN ORGANIZED HEALTH CARE EDUCATION/TRAINING PROGRAM

## 2024-01-23 PROCEDURE — 1159F MED LIST DOCD IN RCRD: CPT | Mod: CPTII,,, | Performed by: STUDENT IN AN ORGANIZED HEALTH CARE EDUCATION/TRAINING PROGRAM

## 2024-01-23 PROCEDURE — 94618 PULMONARY STRESS TESTING: CPT | Mod: 26,,, | Performed by: STUDENT IN AN ORGANIZED HEALTH CARE EDUCATION/TRAINING PROGRAM

## 2024-01-23 PROCEDURE — 3075F SYST BP GE 130 - 139MM HG: CPT | Mod: CPTII,,, | Performed by: STUDENT IN AN ORGANIZED HEALTH CARE EDUCATION/TRAINING PROGRAM

## 2024-01-23 PROCEDURE — 94726 PLETHYSMOGRAPHY LUNG VOLUMES: CPT

## 2024-01-23 PROCEDURE — 94618 PULMONARY STRESS TESTING: CPT

## 2024-01-23 PROCEDURE — 71271 CT THORAX LUNG CANCER SCR C-: CPT | Mod: 26,,, | Performed by: RADIOLOGY

## 2024-01-23 PROCEDURE — 99214 OFFICE O/P EST MOD 30 MIN: CPT | Mod: S$PBB,25,, | Performed by: STUDENT IN AN ORGANIZED HEALTH CARE EDUCATION/TRAINING PROGRAM

## 2024-01-23 PROCEDURE — 94060 EVALUATION OF WHEEZING: CPT

## 2024-01-23 PROCEDURE — 27100098 HC SPACER

## 2024-01-23 PROCEDURE — 71271 CT THORAX LUNG CANCER SCR C-: CPT | Mod: TC

## 2024-01-23 NOTE — PROGRESS NOTES
[facility]  Pulmonology  NEW VISIT     Patient Name:  Andrez Wen  Primary Care Provider: Herminio Gee MD  Date of Service: 01/23/2024  Reason for Referral:  COPD      Chief Complaint:  Shortness of breath    SUBJECTIVE   HPI:  Andrez Wen is a 58 y.o. male with CHF, asthma/COPD overlap, ongoing nicotine dependence and chronic back pain who presents today for follow up of shortness of breath. Last seen 12/2023 with plan for LDCT baseline, PFT and 6MWT.    Bettye reports having shortness of breath with walking short distances. This is stable in severity. He remains able to participate with care for his daughter. The biggest limitation for him at this time is his chronic back pain. He has a stable cough that is at time productive.     Initial HPI  Bettye characterizes his shortness of breath as dyspnea with exertion.  He has a sole caretaker of his daughter who has functional  neuromuscular limitations.  He has a cough which he attributes to his smoking.  He currently manages his dyspnea with albuterol with brief improvement in his shortness of breath. He denies any hospitalizations this year.  He is currently managed with respiratory inhaler which he uses twice daily and albuterol as needed.  He reports prior oxygen therapy.  He has a sleep apnea diagnosis but seldom uses his CPAP.      Past Medical History:   Diagnosis Date    Chronic heart failure     COPD (chronic obstructive pulmonary disease)        No past surgical history on file.    Family History   Family history unknown: Yes        Social History     Socioeconomic History    Marital status:    Tobacco Use    Smoking status: Every Day     Current packs/day: 0.25     Average packs/day: 2.2 packs/day for 50.1 years (111.5 ttl pk-yrs)     Types: Cigarettes     Start date: 1974     Passive exposure: Current    Smokeless tobacco: Never    Tobacco comments:     Patient has tried quitting.    Substance and Sexual Activity     "Alcohol use: Yes     Comment: vodka or crown sometimes.    Drug use: Yes     Frequency: 7.0 times per week     Types: Marijuana     Comment: tries not to    Sexual activity: Not Currently     Partners: Female       Social History     Social History Narrative    Not on file       Review of patient's allergies indicates:  No Known Allergies     Medications: Medications reviewed to include over the counter medications.    Review of Systems: A focused ROS was completed and found to be negative except for that mentioned above.      OBJECTIVE   PHYSICAL EXAM:  Vitals:    01/23/24 1022   BP: 138/70   BP Location: Right arm   Patient Position: Sitting   BP Method: Large (Manual)   Pulse: 88   Resp: 18   SpO2: (!) 90%   Weight: 106.8 kg (235 lb 6.4 oz)   Height: 5' 11" (1.803 m)       GENERAL: NAD  HEENT: normocephalic, non-icteric conjunctivae, moist oral mucosa  RESPIRATORY: diminished lung sounds at but otherwise clear to auscultation, no wheezing, rales or rhonchi  CARDIOVASCULAR: Regular rate and rhythm, no murmurs rubs or gallops.  SKIN: no rash, jaundice, ecchymosis or ulcers  MUSCULOSKELETAL: No clubbing or cyanosis; no pedal edema  NEUROLOGIC: AO ×3, no gross deficits  PSYCH: Normal mood and affect    LABS:  Lab studies reviewed and notable for 12/2023 CBC wnl SEos 90, 04/2023 with normal WBC, serum eosinophils 290 (peak 680), normal LFTs, serum creatinine 0.91, CO2 33    IMAGING:  Imaging reviewed and notable for LDCT 01/2024 with centrilobular and paraseptal emphysema, upper lobe predominant, bilateral lower lobe proximal airway ectasia, prominent mediastinal fat pad, small non calcified lung nodules (rad size 2-3 mm). CXR 08/2022 with hyperinflation of lungs, poorly defined right paratracheal region, narrow mediastinum, no pleural effusion, no consolidation      LUNG FUNCTION TESTING:      SPIROMETRY/PFT:  01/2024 Pre Post   FVC 2.64/-3.11 3.25/-2.21 +BD   FEV1 1.01/-4.44 1.28/-4.05 +BD   FEV1/FVC 38/-4.38 " 39/-4.31   TLC 6.19/-1.00    FRC  4.00/0.28    RV 2.80/1.08    DLCO 8.47/-5.82      10/2020 Pre Post   FVC 1.62/36 2.19/48 +BD   FEV1 0.74/23 0.98/30 +BD   FEV1/FVC 46 45     08/2020 Pre Post   FVC 2.47/51% 1.72/35%   FEV1 1.18/34% 0.89/25%   FEV1/FVC 71 52   There is a paradoxical decrease in spirometric volumes with bronchodilator administration.  Flow volume loop with scooping out of expiratory limb.  Technician comments include coughing throughout test.      6MWD:  Date Distance (ft) Resting SpO2; Darion SpO2 O2 Required   01/2024 1491 95%, RA; 90% none               ASSESSMENT & PLAN     1. Asthma-COPD overlap syndrome  Assessment & Plan:  57 yo M with ongoing nicotine dependence, asthma/COPD overlap presents for follow up evaluation having completed lung function testing. Emphysematous COPD GOLD 3B. Currently on Breztri 2 puff BID with albuterol rescue inhaler. Given asthma overlap, will trial Trelegy for symptoms improvement with goal improvement of exercise capacity. He was instructed to hold Breztri during Trelegy trial.  - Trelegy sample provided  -- will consider ICS/formoterol PRN therapy given asthma overlap for management of symptoms  - 6MWT completed with no rest or exertional hypoxia  - patient with established JANE diagnosis; will obtain ABG with 6MWT in 1 year as part of reassessment  - will consider pulmonary rehabilitation; patient is currently limited by caregiver role for his daughter         2. Nicotine dependence with current use  Assessment & Plan:  Dangers of cigarette smoking were reviewed with patient in detail. Patient was Counseled for 3-10 minutes. Nicotine replacement options were discussed. Nicotine replacement was discussed- not prescribed per patient's request      3. Smoking greater than 40 pack years  Assessment & Plan:  LDCT Baseline 01/2024 independently reviewed; no concerning nodules (LungRADS2). As per previous documentation, will continue lung cancer screening with annual  LDCT (due 01/2025)  - order on f/u visit LDCT            Follow up in about 3 months (around 4/23/2024).      Case was discussed with patient; all questions were answered to patient's satisfaction and patient verbalized understanding.     Sabine Cordova MD  Pulmonary Medicine  [facility]  Phone: [phone]

## 2024-01-23 NOTE — PROCEDURES
SIX MINUTE WALK DISTANCE  BASELINE VITALS  HR: 81   BP: 131/75  SPO2: 95% on RA    END OF STUDY VITALS:  HR: 112  BP: 135/79  SPO2: 91% on RA    RECOVERY VITALS:  After 30s rest  HR: 84  SPO2: 94%    Patient walked 1491 ft with 0 rests.   SpO2 gene was 90% at 2 minutes.       IMPRESSION:  Patient has tachycardia with ambulation that resolves with rest.  Patient does not need oxygen supplementation at rest at rest or with ambulation.      Sabine Cordova MD  Pulmonary and Critical Care  [facility]

## 2024-01-23 NOTE — PROCEDURES
PFT REPORT:  SPIROMETRY:  The pre-BD FVC is decreased, 2.64L/-3.11 Z score.  The pre-BD FEV1 is decreased, 1.01L/-4.44 Z score.  Thre pre-BD FEV1/FVC ratio is 38/-4.38 Z score.    The post-BD FVC is decreased, 3.25L/-2.21 Z score.  The post-BD FEV1 is decreased, 1.28L/-4.05 Z score.  The post-BD FEV1/FVC ratio is 39/-4.31 Z score.    The is a significant bronchodilator effect.  There is scooping out of the expiratory limb of the flow volume curve.    LUNG VOLUMES:  The TLC is normal, 6.19L/-1.00 Z score.  The FRC is normal, 4.00L/0.28 Z score.  The RV is normal, 2.80L/1.08 Z score.    DIFFUSION CAPACITY:  The diffusion capacity is corrected for hemoglobin and is decreased, 8.47/-5.82 Z score.    Interpretation:  The post-BD spirometry shows a severe obstructive defect.  There is significant response to bronchodilators.  There is scooping out of the expiratory limb of the flow volume curve consistent with obstruction.   There is no restrictive defect. All lung volumes are normal.  There is a severe diffusion defect. Decreased diffusion capacity with obstruction is suggestive of emphysema.      Sabine Cordova MD  Pulmonary Medicine  [facility]

## 2024-01-24 PROBLEM — J44.89 ASTHMA-COPD OVERLAP SYNDROME: Status: ACTIVE | Noted: 2022-03-10

## 2024-01-24 NOTE — ASSESSMENT & PLAN NOTE
57 yo M with ongoing nicotine dependence, asthma/COPD overlap presents for follow up evaluation having completed lung function testing. Emphysematous COPD GOLD 3B. Currently on Breztri 2 puff BID with albuterol rescue inhaler. Given asthma overlap, will trial Trelegy for symptoms improvement with goal improvement of exercise capacity. He was instructed to hold Breztri during Trelegy trial.  - Trelegy sample provided  -- will consider ICS/formoterol PRN therapy given asthma overlap for management of symptoms  - 6MWT completed with no rest or exertional hypoxia  - patient with established JANE diagnosis; will obtain ABG with 6MWT in 1 year as part of reassessment  - will consider pulmonary rehabilitation; patient is currently limited by caregiver role for his daughter

## 2024-01-24 NOTE — ASSESSMENT & PLAN NOTE
LDCT Baseline 01/2024 independently reviewed; no concerning nodules (LungRADS2). As per previous documentation, will continue lung cancer screening with annual LDCT (due 01/2025)  - order on f/u visit LDCT

## 2024-02-08 RX ORDER — NAPROXEN 500 MG/1
500 TABLET ORAL 2 TIMES DAILY WITH MEALS
Qty: 60 TABLET | Refills: 0 | Status: SHIPPED | OUTPATIENT
Start: 2024-02-08 | End: 2024-03-26

## 2024-02-09 DIAGNOSIS — Z71.89 COMPLEX CARE COORDINATION: ICD-10-CM

## 2024-02-21 ENCOUNTER — ANESTHESIA (OUTPATIENT)
Dept: GASTROENTEROLOGY | Facility: HOSPITAL | Age: 59
End: 2024-02-21
Payer: MEDICARE

## 2024-02-21 ENCOUNTER — HOSPITAL ENCOUNTER (OUTPATIENT)
Dept: GASTROENTEROLOGY | Facility: HOSPITAL | Age: 59
Discharge: HOME | End: 2024-02-21
Attending: INTERNAL MEDICINE
Payer: MEDICARE

## 2024-02-21 ENCOUNTER — ANESTHESIA EVENT (OUTPATIENT)
Dept: GASTROENTEROLOGY | Facility: HOSPITAL | Age: 59
End: 2024-02-21
Payer: MEDICARE

## 2024-02-21 VITALS
TEMPERATURE: 98 F | DIASTOLIC BLOOD PRESSURE: 50 MMHG | SYSTOLIC BLOOD PRESSURE: 109 MMHG | RESPIRATION RATE: 14 BRPM | HEART RATE: 76 BPM | OXYGEN SATURATION: 90 %

## 2024-02-21 DIAGNOSIS — Z12.11 COLON CANCER SCREENING: Primary | ICD-10-CM

## 2024-02-21 DIAGNOSIS — Z12.11 SCREENING FOR COLON CANCER: ICD-10-CM

## 2024-02-21 PROCEDURE — 27201423 OPTIME MED/SURG SUP & DEVICES STERILE SUPPLY

## 2024-02-21 PROCEDURE — 25000003 PHARM REV CODE 250

## 2024-02-21 PROCEDURE — 45385 COLONOSCOPY W/LESION REMOVAL: CPT | Mod: PT | Performed by: INTERNAL MEDICINE

## 2024-02-21 PROCEDURE — D9220A PRA ANESTHESIA: Mod: PT,,,

## 2024-02-21 PROCEDURE — 63600175 PHARM REV CODE 636 W HCPCS

## 2024-02-21 PROCEDURE — 88305 TISSUE EXAM BY PATHOLOGIST: CPT | Mod: TC,SUR | Performed by: INTERNAL MEDICINE

## 2024-02-21 PROCEDURE — 37000008 HC ANESTHESIA 1ST 15 MINUTES

## 2024-02-21 PROCEDURE — 88305 TISSUE EXAM BY PATHOLOGIST: CPT | Mod: 26,,, | Performed by: PATHOLOGY

## 2024-02-21 PROCEDURE — 45385 COLONOSCOPY W/LESION REMOVAL: CPT | Mod: 33,,, | Performed by: INTERNAL MEDICINE

## 2024-02-21 PROCEDURE — 45380 COLONOSCOPY AND BIOPSY: CPT | Mod: 33,59,, | Performed by: INTERNAL MEDICINE

## 2024-02-21 PROCEDURE — 37000009 HC ANESTHESIA EA ADD 15 MINS

## 2024-02-21 PROCEDURE — 45380 COLONOSCOPY AND BIOPSY: CPT | Mod: PT,59 | Performed by: INTERNAL MEDICINE

## 2024-02-21 RX ORDER — PROPOFOL 10 MG/ML
VIAL (ML) INTRAVENOUS
Status: DISCONTINUED | OUTPATIENT
Start: 2024-02-21 | End: 2024-02-21

## 2024-02-21 RX ORDER — PHENYLEPHRINE HYDROCHLORIDE 10 MG/ML
INJECTION INTRAVENOUS
Status: DISCONTINUED | OUTPATIENT
Start: 2024-02-21 | End: 2024-02-21

## 2024-02-21 RX ORDER — LIDOCAINE HYDROCHLORIDE 20 MG/ML
INJECTION, SOLUTION EPIDURAL; INFILTRATION; INTRACAUDAL; PERINEURAL
Status: DISCONTINUED | OUTPATIENT
Start: 2024-02-21 | End: 2024-02-21

## 2024-02-21 RX ORDER — SODIUM CHLORIDE 9 MG/ML
INJECTION, SOLUTION INTRAVENOUS CONTINUOUS PRN
Status: DISCONTINUED | OUTPATIENT
Start: 2024-02-21 | End: 2024-02-21

## 2024-02-21 RX ORDER — SODIUM CHLORIDE 0.9 % (FLUSH) 0.9 %
10 SYRINGE (ML) INJECTION
Status: DISCONTINUED | OUTPATIENT
Start: 2024-02-21 | End: 2024-02-22 | Stop reason: HOSPADM

## 2024-02-21 RX ADMIN — PHENYLEPHRINE HYDROCHLORIDE 100 MCG: 10 INJECTION INTRAVENOUS at 10:02

## 2024-02-21 RX ADMIN — PROPOFOL 120 MG: 10 INJECTION, EMULSION INTRAVENOUS at 10:02

## 2024-02-21 RX ADMIN — SODIUM CHLORIDE: 9 INJECTION, SOLUTION INTRAVENOUS at 10:02

## 2024-02-21 RX ADMIN — PROPOFOL 30 MG: 10 INJECTION, EMULSION INTRAVENOUS at 10:02

## 2024-02-21 RX ADMIN — LIDOCAINE HYDROCHLORIDE 100 MG: 20 INJECTION, SOLUTION INTRAVENOUS at 10:02

## 2024-02-21 NOTE — ANESTHESIA POSTPROCEDURE EVALUATION
Anesthesia Post Evaluation    Patient: Andrez Wen    Procedure(s) Performed: colonoscopy    Final Anesthesia Type: general      Patient location during evaluation: GI PACU  Patient participation: Yes- Able to Participate  Level of consciousness: awake and alert  Post-procedure vital signs: reviewed and stable  Pain management: adequate  Airway patency: patent    PONV status at discharge: No PONV  Anesthetic complications: no      Cardiovascular status: blood pressure returned to baseline  Respiratory status: unassisted and spontaneous ventilation  Hydration status: euvolemic  Follow-up not needed.  Comments: Refer to nursing note for pain and bertin score upon discharge from recovery              Vitals Value Taken Time   /71 02/21/24 1031   Temp 97.6f 02/21/24 1041   Pulse 81 02/21/24 1040   Resp 12 02/21/24 1040   SpO2 97 % 02/21/24 1040   Vitals shown include unvalidated device data.      No case tracking events are documented in the log.      Pain/Bertin Score: No data recorded

## 2024-02-21 NOTE — H&P
Gastroenterology Pre-procedure H&P    Chief Complaint: Colon cancer screening    History of Present Illness    Andrez Wen is a 58 y.o. male that  has a past medical history of Chronic heart failure and COPD (chronic obstructive pulmonary disease).     Patient here for routine screening. He reports prior colonoscopy several years ago that was complicated by perforation? That he reports was managed conservatively. No reports available for review.    Patient denies wt loss, abdominal pain, diarrhea, melena/hematochezia, change in stool caliber, no anticoagulants, FMHx of GI related malignancies.      Past Medical History:   Diagnosis Date    Chronic heart failure     COPD (chronic obstructive pulmonary disease)        History reviewed. No pertinent surgical history.    Family History   Family history unknown: Yes       Social History     Socioeconomic History    Marital status:    Tobacco Use    Smoking status: Every Day     Current packs/day: 0.25     Average packs/day: 2.2 packs/day for 50.1 years (111.5 ttl pk-yrs)     Types: Cigarettes     Start date: 1974     Passive exposure: Current    Smokeless tobacco: Never    Tobacco comments:     Patient has tried quitting.    Substance and Sexual Activity    Alcohol use: Yes     Comment: vodka or crown sometimes.    Drug use: Yes     Frequency: 7.0 times per week     Types: Marijuana     Comment: tries not to medical card    Sexual activity: Not Currently     Partners: Female       Current Outpatient Medications   Medication Sig Dispense Refill    albuterol (PROVENTIL) 2.5 mg /3 mL (0.083 %) nebulizer solution Take 3 mLs (2.5 mg total) by nebulization 2 (two) times a day. 90 mL 2    albuterol (PROVENTIL/VENTOLIN HFA) 90 mcg/actuation inhaler Proventil HFA 90 mcg/actuation aerosol inhaler  Inhale 2 puffs every 4 hours by inhalation route. 18 g 2    budesonide-glycopyr-formoterol (BREZTRI AEROSPHERE) 160-9-4.8 mcg/actuation HFAA Inhale 2 puffs into the lungs 2  (two) times daily. 10.7 g 2    EScitalopram oxalate (LEXAPRO) 20 MG tablet Take 1 tablet (20 mg total) by mouth once daily. 90 tablet 1    gabapentin (NEURONTIN) 300 MG capsule Take 2 capsules (600 mg total) by mouth 2 (two) times daily. 360 capsule 1    HYDROcodone-acetaminophen (NORCO) 7.5-325 mg per tablet Take 1 tablet by mouth 2 (two) times daily as needed.      losartan (COZAAR) 50 MG tablet Take 1 tablet (50 mg total) by mouth once daily. 90 tablet 3    meloxicam (MOBIC) 7.5 MG tablet Take 1 tablet (7.5 mg total) by mouth once daily. 90 tablet 1    naproxen (NAPROSYN) 500 MG tablet TAKE 1 TABLET BY MOUTH TWICE DAILY WITH MEALS 60 tablet 0    pantoprazole (PROTONIX) 40 MG tablet Take 1 tablet (40 mg total) by mouth once daily. 90 tablet 1    rosuvastatin (CRESTOR) 20 MG tablet Take 1 tablet (20 mg total) by mouth every evening. 90 tablet 1    tiZANidine (ZANAFLEX) 4 MG tablet Take 1 tablet (4 mg total) by mouth every 8 (eight) hours. 270 tablet 0     No current facility-administered medications for this encounter.       Review of patient's allergies indicates:  No Known Allergies    Objective:  There were no vitals filed for this visit.     GEN: normal appearing, NAD, AAO x3  HENT: NCAT, anicteric, OP benign  CV: normal rate, regular rhythm  RESP: NABS, symmetric rise, unlabored  ABD: soft, ND, no guarding or TTP  SKIN: warm and dry  NEURO: grossly afocal    Assessment and Plan:    Proceed with:    Colonoscopy for screening for colon cancer    Nehemiah Ace MD  Gastroenterology

## 2024-02-21 NOTE — DISCHARGE INSTRUCTIONS
Procedure Date  2/21/24     Impression  Overall Impression:   3 subcentimeter polyps in the sigmoid colon were removed with cold forceps biopsy and cold snare  The ileocecal valve, cecum, ascending colon, transverse colon and descending colon appeared normal allowing for limited views.  (grade 1) hemorrhoids  The prep was fair, but inadequate for screening        Recommendation    Await pathology results     Repeat colonoscopy in 1 year with 2 days of clear liquids followed by routine golytely prep      Outcome of procedure: successful Colonoscopy  Disposition: patient to recovery following procedure; discharge to home when appropriate parameters met  Provisions for follow up: please call my office for any unexpected symptoms like chest or abdominal pain or bleeding following your procedure.  Final Diagnosis: colon polyps         Indication  Screening for colon cancer

## 2024-02-21 NOTE — ANESTHESIA PREPROCEDURE EVALUATION
02/21/2024  Andrez Wen is a 58 y.o., male.      Pre-op Assessment    I have reviewed the Patient Summary Reports.     I have reviewed the Nursing Notes. I have reviewed the NPO Status.   I have reviewed the Medications.     Review of Systems  Anesthesia Hx:  No problems with previous Anesthesia                Social:  Recreational Drugs, Smoker, Alcohol Use       Cardiovascular:         Dysrhythmias   CHF    hyperlipidemia                             Pulmonary:   COPD Asthma  Shortness of breath  Sleep Apnea                Hepatic/GI:     GERD             Endocrine:        Obesity / BMI > 30  Psych:   anxiety                 Physical Exam  General: Well nourished, Cooperative, Alert and Oriented    Airway:  Mallampati: II   Mouth Opening: Normal  TM Distance: Normal  Tongue: Normal  Neck ROM: Normal ROM    Dental:  Intact    Chest/Lungs:  Clear to auscultation, Normal Respiratory Rate    Heart:  Rate: Normal  Rhythm: Regular Rhythm  Sounds: Normal    Abdomen:  Normal, Soft, Nontender        Anesthesia Plan  Type of Anesthesia, risks & benefits discussed:    Anesthesia Type: Gen Natural Airway, MAC  Intra-op Monitoring Plan: Standard ASA Monitors  Post Op Pain Control Plan: multimodal analgesia and IV/PO Opioids PRN  Induction:  IV  Informed Consent: Informed consent signed with the Patient and all parties understand the risks and agree with anesthesia plan.  All questions answered.   ASA Score: 3  Day of Surgery Review of History & Physical: I have interviewed and examined the patient. I have reviewed the patient's H&P dated:     Ready For Surgery From Anesthesia Perspective.     .

## 2024-02-21 NOTE — TRANSFER OF CARE
Anesthesia Transfer of Care Note    Patient: Andrez Wen    Procedure(s) Performed: colonoscopy  Patient location: GI    Anesthesia Type: general and MAC    Transport from OR: Transported from OR on room air with adequate spontaneous ventilation    Post pain: adequate analgesia    Post assessment: no apparent anesthetic complications    Post vital signs: stable    Level of consciousness: awake, alert and oriented    Nausea/Vomiting: no nausea/vomiting    Complications: none    Transfer of care protocol was followedComments: Refer to nursing note for pain bertin score upon discharge from recovery      Last vitals: Visit Vitals  BP (!) 109/50   Pulse 76   Temp 36.5 °C (97.7 °F)   Resp 14   SpO2 (!) 90%

## 2024-02-22 LAB
ESTROGEN SERPL-MCNC: NORMAL PG/ML
INSULIN SERPL-ACNC: NORMAL U[IU]/ML
LAB AP GROSS DESCRIPTION: NORMAL
LAB AP LABORATORY NOTES: NORMAL
T3RU NFR SERPL: NORMAL %

## 2024-02-22 NOTE — PROGRESS NOTES
Dr. Gee, thank you for referring this patient to me. I recommend repeat colonoscopy in 1 year due to inadequate prep - we have scheduled this. Please let me know if you have any questions regarding this patient.

## 2024-03-03 DIAGNOSIS — J44.9 CHRONIC OBSTRUCTIVE PULMONARY DISEASE, UNSPECIFIED COPD TYPE: ICD-10-CM

## 2024-03-03 RX ORDER — BUDESONIDE, GLYCOPYRROLATE, AND FORMOTEROL FUMARATE 160; 9; 4.8 UG/1; UG/1; UG/1
2 AEROSOL, METERED RESPIRATORY (INHALATION) 2 TIMES DAILY
Qty: 11 G | Refills: 0 | Status: SHIPPED | OUTPATIENT
Start: 2024-03-03 | End: 2024-05-22 | Stop reason: SDUPTHER

## 2024-03-11 DIAGNOSIS — J44.9 CHRONIC OBSTRUCTIVE PULMONARY DISEASE, UNSPECIFIED COPD TYPE: ICD-10-CM

## 2024-03-11 RX ORDER — ALBUTEROL SULFATE 90 UG/1
AEROSOL, METERED RESPIRATORY (INHALATION)
Qty: 18 G | Refills: 0 | Status: SHIPPED | OUTPATIENT
Start: 2024-03-11 | End: 2024-04-12

## 2024-03-26 RX ORDER — NAPROXEN 500 MG/1
500 TABLET ORAL 2 TIMES DAILY WITH MEALS
Qty: 60 TABLET | Refills: 0 | Status: SHIPPED | OUTPATIENT
Start: 2024-03-26 | End: 2024-05-22

## 2024-04-10 DIAGNOSIS — J44.9 CHRONIC OBSTRUCTIVE PULMONARY DISEASE, UNSPECIFIED COPD TYPE: ICD-10-CM

## 2024-04-12 RX ORDER — ALBUTEROL SULFATE 90 UG/1
AEROSOL, METERED RESPIRATORY (INHALATION)
Qty: 18 G | Refills: 0 | Status: SHIPPED | OUTPATIENT
Start: 2024-04-12 | End: 2024-05-21

## 2024-05-20 DIAGNOSIS — J44.9 CHRONIC OBSTRUCTIVE PULMONARY DISEASE, UNSPECIFIED COPD TYPE: ICD-10-CM

## 2024-05-21 RX ORDER — ALBUTEROL SULFATE 90 UG/1
AEROSOL, METERED RESPIRATORY (INHALATION)
Qty: 18 G | Refills: 0 | Status: SHIPPED | OUTPATIENT
Start: 2024-05-21

## 2024-05-22 ENCOUNTER — OFFICE VISIT (OUTPATIENT)
Dept: FAMILY MEDICINE | Facility: CLINIC | Age: 59
End: 2024-05-22
Payer: MEDICARE

## 2024-05-22 ENCOUNTER — APPOINTMENT (OUTPATIENT)
Dept: RADIOLOGY | Facility: CLINIC | Age: 59
End: 2024-05-22
Attending: FAMILY MEDICINE
Payer: MEDICARE

## 2024-05-22 VITALS
RESPIRATION RATE: 18 BRPM | TEMPERATURE: 98 F | WEIGHT: 225 LBS | BODY MASS INDEX: 31.5 KG/M2 | OXYGEN SATURATION: 96 % | HEIGHT: 71 IN | DIASTOLIC BLOOD PRESSURE: 82 MMHG | HEART RATE: 83 BPM | SYSTOLIC BLOOD PRESSURE: 126 MMHG

## 2024-05-22 DIAGNOSIS — Z01.811 PRE-OPERATIVE RESPIRATORY EXAMINATION: ICD-10-CM

## 2024-05-22 DIAGNOSIS — J44.9 CHRONIC OBSTRUCTIVE PULMONARY DISEASE, UNSPECIFIED COPD TYPE: ICD-10-CM

## 2024-05-22 DIAGNOSIS — F17.200 SMOKING: ICD-10-CM

## 2024-05-22 DIAGNOSIS — K21.9 GASTROESOPHAGEAL REFLUX DISEASE WITHOUT ESOPHAGITIS: ICD-10-CM

## 2024-05-22 DIAGNOSIS — G89.29 CHRONIC BILATERAL LOW BACK PAIN WITHOUT SCIATICA: ICD-10-CM

## 2024-05-22 DIAGNOSIS — M54.50 CHRONIC BILATERAL LOW BACK PAIN WITHOUT SCIATICA: ICD-10-CM

## 2024-05-22 DIAGNOSIS — E78.2 MIXED HYPERLIPIDEMIA: ICD-10-CM

## 2024-05-22 DIAGNOSIS — R63.4 WEIGHT LOSS: Primary | ICD-10-CM

## 2024-05-22 DIAGNOSIS — F41.9 ANXIETY: ICD-10-CM

## 2024-05-22 DIAGNOSIS — F32.A DEPRESSION, UNSPECIFIED DEPRESSION TYPE: ICD-10-CM

## 2024-05-22 DIAGNOSIS — Z01.812 PRE-OPERATIVE LABORATORY EXAMINATION: ICD-10-CM

## 2024-05-22 PROCEDURE — 3079F DIAST BP 80-89 MM HG: CPT | Mod: ,,, | Performed by: FAMILY MEDICINE

## 2024-05-22 PROCEDURE — 71046 X-RAY EXAM CHEST 2 VIEWS: CPT | Mod: TC,RHCUB | Performed by: FAMILY MEDICINE

## 2024-05-22 PROCEDURE — 71046 X-RAY EXAM CHEST 2 VIEWS: CPT | Mod: 26,,, | Performed by: RADIOLOGY

## 2024-05-22 PROCEDURE — 1160F RVW MEDS BY RX/DR IN RCRD: CPT | Mod: ,,, | Performed by: FAMILY MEDICINE

## 2024-05-22 PROCEDURE — 3074F SYST BP LT 130 MM HG: CPT | Mod: ,,, | Performed by: FAMILY MEDICINE

## 2024-05-22 PROCEDURE — 3008F BODY MASS INDEX DOCD: CPT | Mod: ,,, | Performed by: FAMILY MEDICINE

## 2024-05-22 PROCEDURE — 1159F MED LIST DOCD IN RCRD: CPT | Mod: ,,, | Performed by: FAMILY MEDICINE

## 2024-05-22 PROCEDURE — 99214 OFFICE O/P EST MOD 30 MIN: CPT | Mod: ,,, | Performed by: FAMILY MEDICINE

## 2024-05-22 RX ORDER — ROSUVASTATIN CALCIUM 20 MG/1
20 TABLET, COATED ORAL NIGHTLY
Qty: 90 TABLET | Refills: 1 | Status: SHIPPED | OUTPATIENT
Start: 2024-05-22

## 2024-05-22 RX ORDER — ESCITALOPRAM OXALATE 5 MG/1
5 TABLET ORAL DAILY
Qty: 30 TABLET | Refills: 1 | Status: SHIPPED | OUTPATIENT
Start: 2024-05-22 | End: 2025-05-22

## 2024-05-22 RX ORDER — TIZANIDINE 4 MG/1
4 TABLET ORAL EVERY 8 HOURS
Qty: 270 TABLET | Refills: 0 | Status: SHIPPED | OUTPATIENT
Start: 2024-05-22

## 2024-05-22 RX ORDER — PANTOPRAZOLE SODIUM 40 MG/1
40 TABLET, DELAYED RELEASE ORAL DAILY
Qty: 90 TABLET | Refills: 1 | Status: SHIPPED | OUTPATIENT
Start: 2024-05-22

## 2024-05-22 RX ORDER — BUDESONIDE, GLYCOPYRROLATE, AND FORMOTEROL FUMARATE 160; 9; 4.8 UG/1; UG/1; UG/1
2 AEROSOL, METERED RESPIRATORY (INHALATION) 2 TIMES DAILY
Qty: 11 G | Refills: 0 | Status: SHIPPED | OUTPATIENT
Start: 2024-05-22 | End: 2024-06-17

## 2024-05-22 RX ORDER — BUPROPION HYDROCHLORIDE 150 MG/1
150 TABLET, EXTENDED RELEASE ORAL 2 TIMES DAILY
Qty: 60 TABLET | Refills: 11 | Status: SHIPPED | OUTPATIENT
Start: 2024-05-22 | End: 2025-05-22

## 2024-05-22 NOTE — PROGRESS NOTES
Andrez Wen is a 58 y.o. male seen today for medication refills for chronic depression and COPD.  Patient also is a chronic smoker now down to 4 cigarettes a day and we discussed a trial of Wellbutrin to help him quit.  Patient also reports an approximately 50 lb weight loss over the last 2-3 months with decreased appetite and fatigue.  Patient also has become more short of breath with exertion but has been out of his Breztri.      Past Medical History:   Diagnosis Date    Chronic heart failure     COPD (chronic obstructive pulmonary disease)      Family History   Family history unknown: Yes     Current Outpatient Medications on File Prior to Visit   Medication Sig Dispense Refill    albuterol (PROVENTIL) 2.5 mg /3 mL (0.083 %) nebulizer solution Take 3 mLs (2.5 mg total) by nebulization 2 (two) times a day. 90 mL 2    albuterol (PROVENTIL/VENTOLIN HFA) 90 mcg/actuation inhaler INHALE 2 PUFFS BY MOUTH EVERY 4 HOURS 18 g 0    HYDROcodone-acetaminophen (NORCO) 7.5-325 mg per tablet Take 1 tablet by mouth 2 (two) times daily as needed.      [DISCONTINUED] BREZTRI AEROSPHERE 160-9-4.8 mcg/actuation HFAA INHALE 2 PUFFS TWICE DAILY 11 g 0    [DISCONTINUED] EScitalopram oxalate (LEXAPRO) 20 MG tablet Take 1 tablet (20 mg total) by mouth once daily. 90 tablet 1    [DISCONTINUED] meloxicam (MOBIC) 7.5 MG tablet Take 1 tablet (7.5 mg total) by mouth once daily. 90 tablet 1    [DISCONTINUED] naproxen (NAPROSYN) 500 MG tablet TAKE 1 TABLET BY MOUTH TWICE DAILY WITH MEALS 60 tablet 0    [DISCONTINUED] pantoprazole (PROTONIX) 40 MG tablet Take 1 tablet (40 mg total) by mouth once daily. 90 tablet 1    [DISCONTINUED] rosuvastatin (CRESTOR) 20 MG tablet Take 1 tablet (20 mg total) by mouth every evening. 90 tablet 1    [DISCONTINUED] tiZANidine (ZANAFLEX) 4 MG tablet Take 1 tablet (4 mg total) by mouth every 8 (eight) hours. 270 tablet 0    gabapentin (NEURONTIN) 300 MG capsule Take 2 capsules (600 mg total) by mouth 2 (two)  times daily. 360 capsule 1    [DISCONTINUED] losartan (COZAAR) 50 MG tablet Take 1 tablet (50 mg total) by mouth once daily. 90 tablet 3     No current facility-administered medications on file prior to visit.     Immunization History   Administered Date(s) Administered    COVID-19 Vaccine 02/26/2021    COVID-19, MRNA, LN-S, PF (MODERNA FULL 0.5 ML DOSE) 02/26/2021, 03/26/2021       Review of Systems   Constitutional:  Positive for malaise/fatigue and weight loss. Negative for fever.   Respiratory:  Positive for cough and shortness of breath.    Cardiovascular:  Negative for chest pain and palpitations.   Gastrointestinal:  Negative for nausea and vomiting.   Psychiatric/Behavioral:  Negative for depression.         Vitals:    05/22/24 1630   BP: 126/82   Pulse: 83   Resp: 18   Temp: 97.8 °F (36.6 °C)       Physical Exam  Vitals reviewed.   Constitutional:       Appearance: Normal appearance.   HENT:      Head: Normocephalic.   Eyes:      Extraocular Movements: Extraocular movements intact.      Conjunctiva/sclera: Conjunctivae normal.      Pupils: Pupils are equal, round, and reactive to light.   Neck:      Thyroid: No thyroid mass or thyromegaly.   Cardiovascular:      Rate and Rhythm: Normal rate and regular rhythm.      Heart sounds: Normal heart sounds. No murmur heard.     No gallop.   Pulmonary:      Effort: Pulmonary effort is normal. No respiratory distress.      Breath sounds: Decreased air movement present. Decreased breath sounds, wheezing and rhonchi present. No rales.   Abdominal:      General: Abdomen is protuberant. Bowel sounds are normal.      Palpations: There is no fluid wave or mass.      Tenderness: There is abdominal tenderness in the left upper quadrant.      Comments: Liver and spleen signs or difficult to ascertain secondary to the patient's habitus   Skin:     General: Skin is warm and dry.      Coloration: Skin is not jaundiced or pale.   Neurological:      Mental Status: He is alert.    Psychiatric:         Mood and Affect: Mood normal.         Behavior: Behavior normal.         Thought Content: Thought content normal.         Judgment: Judgment normal.          Assessment and Plan  1. Weight loss  -     TSH; Future; Expected date: 05/22/2024  -     T4, Free; Future; Expected date: 05/22/2024  -     X-Ray Chest PA And Lateral; Future; Expected date: 05/22/2024  -     Lactate Dehydrogenase; Future; Expected date: 05/22/2024    2. Chronic obstructive pulmonary disease, unspecified COPD type  -     budesonide-glycopyr-formoterol (BREZTRI AEROSPHERE) 160-9-4.8 mcg/actuation HFAA; Inhale 2 puffs into the lungs 2 (two) times daily.  Dispense: 11 g; Refill: 0    3. Gastroesophageal reflux disease without esophagitis  -     pantoprazole (PROTONIX) 40 MG tablet; Take 1 tablet (40 mg total) by mouth once daily.  Dispense: 90 tablet; Refill: 1    4. Chronic bilateral low back pain without sciatica  -     tiZANidine (ZANAFLEX) 4 MG tablet; Take 1 tablet (4 mg total) by mouth every 8 (eight) hours.  Dispense: 270 tablet; Refill: 0    5. Mixed hyperlipidemia  -     rosuvastatin (CRESTOR) 20 MG tablet; Take 1 tablet (20 mg total) by mouth every evening.  Dispense: 90 tablet; Refill: 1  -     CBC Auto Differential; Future; Expected date: 05/22/2024  -     Comprehensive Metabolic Panel; Future; Expected date: 05/22/2024    6. Pre-operative respiratory examination  -     X-Ray Chest PA And Lateral    7. Pre-operative laboratory examination  -     CBC Auto Differential  -     Basic Metabolic Panel    8. Smoking  -     buPROPion (WELLBUTRIN SR) 150 MG TBSR 12 hr tablet; Take 1 tablet (150 mg total) by mouth 2 (two) times daily.  Dispense: 60 tablet; Refill: 11    9. Anxiety  -     EScitalopram oxalate (LEXAPRO) 5 MG Tab; Take 1 tablet (5 mg total) by mouth once daily.  Dispense: 30 tablet; Refill: 1    10. Depression, unspecified depression type  -     EScitalopram oxalate (LEXAPRO) 5 MG Tab; Take 1 tablet (5 mg  total) by mouth once daily.  Dispense: 30 tablet; Refill: 1             Return to clinic in 2 weeks for follow-up or as needed.  Chest x-ray appears to show hyperexpansion.    Health Maintenance Topics with due status: Not Due       Topic Last Completion Date    Influenza Vaccine 11/16/2022    Lipid Panel 04/12/2023    LDCT Lung Screen 01/23/2024    Colorectal Cancer Screening 02/21/2024

## 2024-05-23 LAB
ALBUMIN SERPL BCP-MCNC: 4 G/DL (ref 3.5–5)
ALBUMIN/GLOB SERPL: 1.1 {RATIO}
ALP SERPL-CCNC: 110 U/L (ref 45–115)
ALT SERPL W P-5'-P-CCNC: 24 U/L (ref 16–61)
ANION GAP SERPL CALCULATED.3IONS-SCNC: 9 MMOL/L (ref 7–16)
AST SERPL W P-5'-P-CCNC: 21 U/L (ref 15–37)
BASOPHILS # BLD AUTO: 0.09 K/UL (ref 0–0.2)
BASOPHILS NFR BLD AUTO: 0.8 % (ref 0–1)
BILIRUB SERPL-MCNC: 0.7 MG/DL (ref ?–1.2)
BUN SERPL-MCNC: 10 MG/DL (ref 7–18)
BUN/CREAT SERPL: 10 (ref 6–20)
CALCIUM SERPL-MCNC: 9.7 MG/DL (ref 8.5–10.1)
CHLORIDE SERPL-SCNC: 107 MMOL/L (ref 98–107)
CO2 SERPL-SCNC: 31 MMOL/L (ref 21–32)
CREAT SERPL-MCNC: 0.99 MG/DL (ref 0.7–1.3)
DIFFERENTIAL METHOD BLD: ABNORMAL
EGFR (NO RACE VARIABLE) (RUSH/TITUS): 87 ML/MIN/1.73M2
EOSINOPHIL # BLD AUTO: 0.3 K/UL (ref 0–0.5)
EOSINOPHIL NFR BLD AUTO: 2.8 % (ref 1–4)
ERYTHROCYTE [DISTWIDTH] IN BLOOD BY AUTOMATED COUNT: 14.4 % (ref 11.5–14.5)
GLOBULIN SER-MCNC: 3.6 G/DL (ref 2–4)
GLUCOSE SERPL-MCNC: 82 MG/DL (ref 74–106)
HCT VFR BLD AUTO: 53.1 % (ref 40–54)
HGB BLD-MCNC: 15.7 G/DL (ref 13.5–18)
IMM GRANULOCYTES # BLD AUTO: 0.04 K/UL (ref 0–0.04)
IMM GRANULOCYTES NFR BLD: 0.4 % (ref 0–0.4)
LDH SERPL-CCNC: 187 U/L (ref 87–241)
LYMPHOCYTES # BLD AUTO: 2.87 K/UL (ref 1–4.8)
LYMPHOCYTES NFR BLD AUTO: 26.6 % (ref 27–41)
MCH RBC QN AUTO: 28.4 PG (ref 27–31)
MCHC RBC AUTO-ENTMCNC: 29.6 G/DL (ref 32–36)
MCV RBC AUTO: 96.2 FL (ref 80–96)
MONOCYTES # BLD AUTO: 0.77 K/UL (ref 0–0.8)
MONOCYTES NFR BLD AUTO: 7.1 % (ref 2–6)
MPC BLD CALC-MCNC: 12 FL (ref 9.4–12.4)
NEUTROPHILS # BLD AUTO: 6.7 K/UL (ref 1.8–7.7)
NEUTROPHILS NFR BLD AUTO: 62.3 % (ref 53–65)
NRBC # BLD AUTO: 0 X10E3/UL
NRBC, AUTO (.00): 0 %
PLATELET # BLD AUTO: 235 K/UL (ref 150–400)
POTASSIUM SERPL-SCNC: 7.6 MMOL/L (ref 3.5–5.1)
PROT SERPL-MCNC: 7.6 G/DL (ref 6.4–8.2)
RBC # BLD AUTO: 5.52 M/UL (ref 4.6–6.2)
SODIUM SERPL-SCNC: 139 MMOL/L (ref 136–145)
T4 FREE SERPL-MCNC: 0.89 NG/DL (ref 0.76–1.46)
TSH SERPL DL<=0.005 MIU/L-ACNC: 1.28 UIU/ML (ref 0.36–3.74)
WBC # BLD AUTO: 10.77 K/UL (ref 4.5–11)

## 2024-05-23 PROCEDURE — 84443 ASSAY THYROID STIM HORMONE: CPT | Mod: ,,, | Performed by: CLINICAL MEDICAL LABORATORY

## 2024-05-23 PROCEDURE — 84439 ASSAY OF FREE THYROXINE: CPT | Mod: ,,, | Performed by: CLINICAL MEDICAL LABORATORY

## 2024-05-23 PROCEDURE — 83615 LACTATE (LD) (LDH) ENZYME: CPT | Mod: ,,, | Performed by: CLINICAL MEDICAL LABORATORY

## 2024-05-23 PROCEDURE — 85025 COMPLETE CBC W/AUTO DIFF WBC: CPT | Mod: ,,, | Performed by: CLINICAL MEDICAL LABORATORY

## 2024-05-23 PROCEDURE — 80053 COMPREHEN METABOLIC PANEL: CPT | Mod: ,,, | Performed by: CLINICAL MEDICAL LABORATORY

## 2024-05-24 ENCOUNTER — TELEPHONE (OUTPATIENT)
Dept: FAMILY MEDICINE | Facility: CLINIC | Age: 59
End: 2024-05-24
Payer: MEDICARE

## 2024-05-24 ENCOUNTER — OFFICE VISIT (OUTPATIENT)
Dept: FAMILY MEDICINE | Facility: CLINIC | Age: 59
End: 2024-05-24
Payer: MEDICARE

## 2024-05-24 VITALS
OXYGEN SATURATION: 93 % | HEIGHT: 71 IN | RESPIRATION RATE: 18 BRPM | WEIGHT: 225 LBS | TEMPERATURE: 98 F | BODY MASS INDEX: 31.5 KG/M2 | HEART RATE: 61 BPM | SYSTOLIC BLOOD PRESSURE: 150 MMHG | DIASTOLIC BLOOD PRESSURE: 86 MMHG

## 2024-05-24 DIAGNOSIS — Z09 FOLLOW-UP EXAM: Primary | ICD-10-CM

## 2024-05-24 DIAGNOSIS — E87.5 SERUM POTASSIUM ELEVATED: ICD-10-CM

## 2024-05-24 LAB — POTASSIUM SERPL-SCNC: 4.5 MMOL/L (ref 3.5–5.1)

## 2024-05-24 PROCEDURE — 84132 ASSAY OF SERUM POTASSIUM: CPT | Mod: ,,, | Performed by: CLINICAL MEDICAL LABORATORY

## 2024-05-24 PROCEDURE — 3077F SYST BP >= 140 MM HG: CPT | Mod: ,,, | Performed by: NURSE PRACTITIONER

## 2024-05-24 PROCEDURE — 1160F RVW MEDS BY RX/DR IN RCRD: CPT | Mod: ,,, | Performed by: NURSE PRACTITIONER

## 2024-05-24 PROCEDURE — 3079F DIAST BP 80-89 MM HG: CPT | Mod: ,,, | Performed by: NURSE PRACTITIONER

## 2024-05-24 PROCEDURE — 3008F BODY MASS INDEX DOCD: CPT | Mod: ,,, | Performed by: NURSE PRACTITIONER

## 2024-05-24 PROCEDURE — 1159F MED LIST DOCD IN RCRD: CPT | Mod: ,,, | Performed by: NURSE PRACTITIONER

## 2024-05-24 PROCEDURE — 99214 OFFICE O/P EST MOD 30 MIN: CPT | Mod: ,,, | Performed by: NURSE PRACTITIONER

## 2024-05-24 NOTE — PROGRESS NOTES
[facility]    Chief Complaint      Chief Complaint   Patient presents with    Shortness of Breath     Patient reports its from his cough. Which is chronic. And states he has not had a cigarette x24 hours. With tightness in chest.        History of Present Illness      Andrez Wen is a 58 y.o. male. He  has a past medical history of Chronic heart failure and COPD (chronic obstructive pulmonary disease)., who presents today for mild increase in SOB.  States he had been out of his medication for about 1.5 weeks and just got them yesterday.  Admits he did also cut grass yesterday for about 4 hours.  Also states he only takes his breathing tx 2x daily instead of 4.  He has wheezing today but he states he always has this since he was about 12 yrs ago.     Past Medical History:  Past Medical History:   Diagnosis Date    Chronic heart failure     COPD (chronic obstructive pulmonary disease)        Past Surgical History:   has no past surgical history on file.    Social History:  Social History     Tobacco Use    Smoking status: Every Day     Current packs/day: 0.25     Average packs/day: 2.2 packs/day for 50.4 years (111.6 ttl pk-yrs)     Types: Cigarettes     Start date: 1974     Passive exposure: Current    Smokeless tobacco: Never    Tobacco comments:     Patient has tried quitting.    Substance Use Topics    Alcohol use: Yes     Comment: vodka or crown sometimes.    Drug use: Yes     Frequency: 7.0 times per week     Types: Marijuana     Comment: tries not to medical card       I personally reviewed all past medical, surgical, and social.     Review of Systems   Constitutional:  Positive for fatigue. Negative for chills and fever.   Respiratory:  Positive for chest tightness and shortness of breath. Negative for cough.    Cardiovascular: Negative.    Gastrointestinal:  Negative for abdominal pain.   Musculoskeletal:  Negative for gait problem.   Skin: Negative.    Neurological:  Negative for headaches.         Medications:  Outpatient Encounter Medications as of 5/24/2024   Medication Sig Dispense Refill    albuterol (PROVENTIL) 2.5 mg /3 mL (0.083 %) nebulizer solution Take 3 mLs (2.5 mg total) by nebulization 2 (two) times a day. 90 mL 2    albuterol (PROVENTIL/VENTOLIN HFA) 90 mcg/actuation inhaler INHALE 2 PUFFS BY MOUTH EVERY 4 HOURS 18 g 0    budesonide-glycopyr-formoterol (BREZTRI AEROSPHERE) 160-9-4.8 mcg/actuation HFAA Inhale 2 puffs into the lungs 2 (two) times daily. 11 g 0    buPROPion (WELLBUTRIN SR) 150 MG TBSR 12 hr tablet Take 1 tablet (150 mg total) by mouth 2 (two) times daily. 60 tablet 11    EScitalopram oxalate (LEXAPRO) 5 MG Tab Take 1 tablet (5 mg total) by mouth once daily. 30 tablet 1    HYDROcodone-acetaminophen (NORCO) 7.5-325 mg per tablet Take 1 tablet by mouth 2 (two) times daily as needed.      pantoprazole (PROTONIX) 40 MG tablet Take 1 tablet (40 mg total) by mouth once daily. 90 tablet 1    rosuvastatin (CRESTOR) 20 MG tablet Take 1 tablet (20 mg total) by mouth every evening. 90 tablet 1    tiZANidine (ZANAFLEX) 4 MG tablet Take 1 tablet (4 mg total) by mouth every 8 (eight) hours. 270 tablet 0    gabapentin (NEURONTIN) 300 MG capsule Take 2 capsules (600 mg total) by mouth 2 (two) times daily. 360 capsule 1     No facility-administered encounter medications on file as of 5/24/2024.       Allergies:  Review of patient's allergies indicates:  No Known Allergies    Health Maintenance:  Immunization History   Administered Date(s) Administered    COVID-19 Vaccine 02/26/2021    COVID-19, MRNA, LN-S, PF (MODERNA FULL 0.5 ML DOSE) 02/26/2021, 03/26/2021      Health Maintenance   Topic Date Due    Hepatitis C Screening  Never done    TETANUS VACCINE  Never done    Shingles Vaccine (1 of 2) Never done    LDCT Lung Screen  01/23/2025    Colorectal Cancer Screening  02/21/2025    Lipid Panel  04/12/2028        Physical Exam      Vital Signs  Temp: 98.2 °F (36.8 °C)  Temp Source: Oral  Pulse:  "61  Resp: 18  SpO2: (!) 93 %  BP: (!) 150/86  BP Location: Right arm  Patient Position: Sitting  Pain Score: 0-No pain  Height and Weight  Height: 5' 11" (180.3 cm)  Weight: 102.1 kg (225 lb)  BSA (Calculated - sq m): 2.26 sq meters  BMI (Calculated): 31.4  Weight in (lb) to have BMI = 25: 178.9]    Physical Exam  Vitals and nursing note reviewed.   Constitutional:       Appearance: Normal appearance.   HENT:      Head: Normocephalic.      Right Ear: Hearing normal.      Left Ear: Hearing normal.      Nose: Nose normal.   Eyes:      General: Lids are normal.      Conjunctiva/sclera: Conjunctivae normal.   Neck:      Thyroid: No thyromegaly.   Cardiovascular:      Rate and Rhythm: Normal rate and regular rhythm.      Heart sounds: Normal heart sounds.   Pulmonary:      Effort: Pulmonary effort is normal.      Breath sounds: Wheezing present.      Comments: Patient states he has has wheezing for years  Musculoskeletal:         General: Normal range of motion.      Cervical back: Normal range of motion and neck supple.      Right lower leg: No edema.      Left lower leg: No edema.   Skin:     General: Skin is warm and dry.   Neurological:      General: No focal deficit present.      Mental Status: He is alert and oriented to person, place, and time.      Gait: Gait is intact.          Laboratory:  CBC:  Recent Labs   Lab 04/12/23  1121 12/28/23  1445 05/23/24  0925   WBC 9.27 7.73 10.77   RBC 5.11 5.23 5.52   Hemoglobin 14.8 15.2 15.7   Hematocrit 47.6 46.8 53.1   Platelet Count 300 338 235   MCV 93.2 89.5 96.2 H   MCH 29.0 29.1 28.4   MCHC 31.1 L 32.5 29.6 L     CMP:  Recent Labs   Lab 04/12/23  1121 12/28/23  1445 05/23/24  0925 05/24/24  1054   Glucose 80 97 82  --    Calcium 9.8 9.2 9.7  --    Albumin 3.9 2.9 L 4.0  --    Total Protein 7.0 7.7 7.6  --    Sodium 137 139 139  --    Potassium 4.8 5.1 7.6 HH 4.5   CO2 33 H 32 31  --    Chloride 104 102 107  --    BUN 16 8 10  --    Alk Phos 83 62 110  --    ALT 32 50 " 24  --    AST 18 48 H 21  --    Bilirubin, Total 0.5 1.0 0.7  --      LIPIDS:  Recent Labs   Lab 08/24/21  1100 03/10/22  1400 11/16/22  1112 04/12/23  1121 05/23/24  0925   TSH 1.390 1.810  --   --  1.280   HDL Cholesterol 51 44 48 57  --    Cholesterol 180 149 184 157  --    Triglycerides 88 90 78 59  --    LDL Calculated 111 87 120 88  --    Cholesterol/HDL Ratio (Risk Factor) 3.5 3.4 3.8 2.8  --    Non- 105 136 100  --      TSH:  Recent Labs   Lab 08/24/21  1100 03/10/22  1400 05/23/24  0925   TSH 1.390 1.810 1.280     A1C:  Recent Labs   Lab 08/24/21  1100 03/10/22  1400 08/22/22  1418   Hemoglobin A1C 5.7 5.7 5.7       Assessment/Plan     Andrez Wen is a 58 y.o.male with:     1. Follow-up exam  -     Potassium; Future; Expected date: 05/24/2024    2. Serum potassium elevated  -     Potassium; Future; Expected date: 05/24/2024    Patient has a Medrol dose pack at home- advised to start taking it today  Advised to start doing his breathing tx 3-4 x daily instead of only 2x   Missed appt with Pulmonology on 4/23/24   Labs drawn today for elevated K+ level  Reviewed results of his chest xray from this morning ordered by Dr. Gee     Total time spent face-to-face and non-face-to-face coordinating care for this encounter was: 30 minutes     Chronic conditions status updated as per HPI.  Other than changes above, cont current medications and maintain follow up with specialists.  Return to clinic in 2 week(s).    Mee Kilpatrick, P  [facility]

## 2024-05-24 NOTE — TELEPHONE ENCOUNTER
----- Message from Herminio Gee MD sent at 5/24/2024  7:53 AM CDT -----  Labs look good except for his potassium please have him follow-up today for repeat potassium level

## 2024-05-24 NOTE — TELEPHONE ENCOUNTER
Notified pt of results showing critical level potassium, provider requested he follow-up today for repeat potassium level. Pt verbalized understanding and stated he would come up to the clinic to have lab redrawn. I inquired of any chest pain or flutters, pt verbalized none but that his sob has gotten worse; stating he couldn't walk to the bathroom without having a coughing fit. Consulted Dr Gee whom stated to see if pt could come in to see Mee. Relayed request to pt, whom stated he could. Informed pt that his potassium will be redrawn during visit. Pt verbalized understanding. Transferred pt to  to schedule appt for today.

## 2024-05-24 NOTE — TELEPHONE ENCOUNTER
----- Message from Herminio Gee MD sent at 5/23/2024  7:59 AM CDT -----  Chest x-ray is consistent with COPD but no other findings

## 2024-05-31 DIAGNOSIS — F41.9 ANXIETY: Primary | ICD-10-CM

## 2024-06-16 DIAGNOSIS — J44.9 CHRONIC OBSTRUCTIVE PULMONARY DISEASE, UNSPECIFIED COPD TYPE: ICD-10-CM

## 2024-06-17 RX ORDER — BUDESONIDE, GLYCOPYRROLATE, AND FORMOTEROL FUMARATE 160; 9; 4.8 UG/1; UG/1; UG/1
2 AEROSOL, METERED RESPIRATORY (INHALATION) 2 TIMES DAILY
Qty: 11 G | Refills: 0 | Status: SHIPPED | OUTPATIENT
Start: 2024-06-17

## 2024-06-19 ENCOUNTER — OFFICE VISIT (OUTPATIENT)
Dept: FAMILY MEDICINE | Facility: CLINIC | Age: 59
End: 2024-06-19
Payer: MEDICARE

## 2024-06-19 VITALS
HEIGHT: 71 IN | BODY MASS INDEX: 31.05 KG/M2 | HEART RATE: 63 BPM | WEIGHT: 221.81 LBS | DIASTOLIC BLOOD PRESSURE: 77 MMHG | RESPIRATION RATE: 18 BRPM | TEMPERATURE: 98 F | OXYGEN SATURATION: 95 % | SYSTOLIC BLOOD PRESSURE: 128 MMHG

## 2024-06-19 DIAGNOSIS — T24.202A PARTIAL THICKNESS BURN OF LEFT LOWER EXTREMITY, INITIAL ENCOUNTER: Primary | ICD-10-CM

## 2024-06-19 PROCEDURE — 3078F DIAST BP <80 MM HG: CPT | Mod: ,,, | Performed by: FAMILY MEDICINE

## 2024-06-19 PROCEDURE — 1159F MED LIST DOCD IN RCRD: CPT | Mod: ,,, | Performed by: FAMILY MEDICINE

## 2024-06-19 PROCEDURE — 3074F SYST BP LT 130 MM HG: CPT | Mod: ,,, | Performed by: FAMILY MEDICINE

## 2024-06-19 PROCEDURE — 1160F RVW MEDS BY RX/DR IN RCRD: CPT | Mod: ,,, | Performed by: FAMILY MEDICINE

## 2024-06-19 PROCEDURE — 3008F BODY MASS INDEX DOCD: CPT | Mod: ,,, | Performed by: FAMILY MEDICINE

## 2024-06-19 PROCEDURE — 99213 OFFICE O/P EST LOW 20 MIN: CPT | Mod: ,,, | Performed by: FAMILY MEDICINE

## 2024-06-19 RX ORDER — SILVER SULFADIAZINE 10 G/1000G
CREAM TOPICAL 2 TIMES DAILY
Qty: 400 G | Refills: 1 | Status: SHIPPED | OUTPATIENT
Start: 2024-06-19
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
## 2024-06-27 DIAGNOSIS — T24.202A PARTIAL THICKNESS BURN OF LEFT LOWER EXTREMITY, INITIAL ENCOUNTER: ICD-10-CM

## 2024-06-27 RX ORDER — SILVER SULFADIAZINE 10 G/1000G
CREAM TOPICAL 2 TIMES DAILY
Qty: 400 G | Refills: 1 | Status: SHIPPED | OUTPATIENT
Start: 2024-06-27

## 2024-07-03 ENCOUNTER — OFFICE VISIT (OUTPATIENT)
Dept: FAMILY MEDICINE | Facility: CLINIC | Age: 59
End: 2024-07-03
Payer: MEDICARE

## 2024-07-03 VITALS
HEIGHT: 73 IN | TEMPERATURE: 98 F | BODY MASS INDEX: 28.43 KG/M2 | WEIGHT: 214.5 LBS | HEART RATE: 74 BPM | OXYGEN SATURATION: 85 % | DIASTOLIC BLOOD PRESSURE: 84 MMHG | SYSTOLIC BLOOD PRESSURE: 128 MMHG

## 2024-07-03 DIAGNOSIS — E78.2 MIXED HYPERLIPIDEMIA: ICD-10-CM

## 2024-07-03 DIAGNOSIS — F17.210 SMOKING GREATER THAN 40 PACK YEARS: Primary | ICD-10-CM

## 2024-07-03 DIAGNOSIS — F17.200 TOBACCO DEPENDENCE SYNDROME: ICD-10-CM
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
## 2024-07-11 ENCOUNTER — DOCUMENT SCAN (OUTPATIENT)
Dept: HOME HEALTH SERVICES | Facility: HOSPITAL | Age: 59
End: 2024-07-11
Payer: MEDICARE

## 2024-07-14 DIAGNOSIS — F41.9 ANXIETY: ICD-10-CM

## 2024-07-14 DIAGNOSIS — F32.A DEPRESSION, UNSPECIFIED DEPRESSION TYPE: ICD-10-CM

## 2024-07-15 RX ORDER — ESCITALOPRAM OXALATE 5 MG/1
5 TABLET ORAL
Qty: 30 TABLET | Refills: 5 | Status: SHIPPED | OUTPATIENT
Start: 2024-07-15

## 2024-07-22 ENCOUNTER — DOCUMENT SCAN (OUTPATIENT)
Dept: HOME HEALTH SERVICES | Facility: HOSPITAL | Age: 59
End: 2024-07-22
Payer: MEDICARE

## 2024-07-22 ENCOUNTER — EXTERNAL HOME HEALTH (OUTPATIENT)
Dept: HOME HEALTH SERVICES | Facility: HOSPITAL | Age: 59
End: 2024-07-22
Payer: MEDICARE

## 2024-07-25 DIAGNOSIS — G89.29 CHRONIC BILATERAL LOW BACK PAIN WITHOUT SCIATICA: ICD-10-CM

## 2024-07-25 DIAGNOSIS — M54.50 CHRONIC BILATERAL LOW BACK PAIN WITHOUT SCIATICA: ICD-10-CM

## 2024-07-25 DIAGNOSIS — J44.9 CHRONIC OBSTRUCTIVE PULMONARY DISEASE, UNSPECIFIED COPD TYPE: ICD-10-CM

## 2024-07-25 RX ORDER — ALBUTEROL SULFATE 90 UG/1
AEROSOL, METERED RESPIRATORY (INHALATION)
Qty: 18 G | Refills: 2 | Status: SHIPPED | OUTPATIENT
Start: 2024-07-25

## 2024-07-25 RX ORDER — GABAPENTIN 300 MG/1
600 CAPSULE ORAL 2 TIMES DAILY
Qty: 360 CAPSULE | Refills: 1 | Status: SHIPPED | OUTPATIENT
Start: 2024-07-25 | End: 2025-01-21

## 2024-07-25 RX ORDER — TIZANIDINE 4 MG/1
4 TABLET ORAL EVERY 8 HOURS
Qty: 270 TABLET | Refills: 0 | Status: SHIPPED | OUTPATIENT
Start: 2024-07-25

## 2024-07-25 RX ORDER — BUDESONIDE, GLYCOPYRROLATE, AND FORMOTEROL FUMARATE 160; 9; 4.8 UG/1; UG/1; UG/1
2 AEROSOL, METERED RESPIRATORY (INHALATION) 2 TIMES DAILY
Qty: 11 G | Refills: 2 | Status: SHIPPED | OUTPATIENT
Start: 2024-07-25

## 2024-07-25 NOTE — TELEPHONE ENCOUNTER
----- Message from Erma Cornejo sent at 7/24/2024 12:07 PM CDT -----  Regarding: Med Refill  Contact: Patient  Pt needs:BREZTRI AEROSPHERE 160-9-4.8 mcg/actuation HFAA, gabapentin (NEURONTIN) 300 MG capsule, albuterol (PROVENTIL/VENTOLIN HFA) 90 mcg/actuation inhaler, tiZANidine (ZANAFLEX) 4 MG tablet    Pharmacy: [facility]    Phone #: [phone] (P)

## 2024-08-14 DIAGNOSIS — K21.9 GASTROESOPHAGEAL REFLUX DISEASE WITHOUT ESOPHAGITIS: ICD-10-CM

## 2024-08-14 DIAGNOSIS — E78.2 MIXED HYPERLIPIDEMIA: ICD-10-CM

## 2024-08-15 RX ORDER — PANTOPRAZOLE SODIUM 40 MG/1
40 TABLET, DELAYED RELEASE ORAL
Qty: 90 TABLET | Refills: 1 | Status: SHIPPED | OUTPATIENT
Start: 2024-08-15

## 2024-08-15 RX ORDER — ROSUVASTATIN CALCIUM 20 MG/1
20 TABLET, COATED ORAL NIGHTLY
Qty: 90 TABLET | Refills: 1 | Status: SHIPPED | OUTPATIENT
Start: 2024-08-15

## 2024-09-09 ENCOUNTER — EXTERNAL HOME HEALTH (OUTPATIENT)
Dept: HOME HEALTH SERVICES | Facility: HOSPITAL | Age: 59
End: 2024-09-09
Payer: MEDICARE

## 2024-09-09 DIAGNOSIS — Z71.89 COMPLEX CARE COORDINATION: ICD-10-CM

## 2024-09-25 DIAGNOSIS — T24.202A PARTIAL THICKNESS BURN OF LEFT LOWER EXTREMITY, INITIAL ENCOUNTER: ICD-10-CM

## 2024-09-25 RX ORDER — SILVER SULFADIAZINE 10 G/1000G
CREAM TOPICAL
Qty: 400 G | Refills: 10 | Status: SHIPPED | OUTPATIENT
Start: 2024-09-25

## 2024-10-02 ENCOUNTER — HOSPITAL ENCOUNTER (OUTPATIENT)
Facility: HOSPITAL | Age: 59
Discharge: HOME | End: 2024-10-02
Attending: ANESTHESIOLOGY | Admitting: ANESTHESIOLOGY
Payer: MEDICARE

## 2024-10-02 ENCOUNTER — ANESTHESIA (OUTPATIENT)
Dept: PAIN MEDICINE | Facility: HOSPITAL | Age: 59
End: 2024-10-02
Payer: MEDICARE

## 2024-10-02 ENCOUNTER — ANESTHESIA EVENT (OUTPATIENT)
Dept: PAIN MEDICINE | Facility: HOSPITAL | Age: 59
End: 2024-10-02
Payer: MEDICARE

## 2024-10-02 VITALS
HEIGHT: 71 IN | RESPIRATION RATE: 15 BRPM | WEIGHT: 214 LBS | BODY MASS INDEX: 29.96 KG/M2 | OXYGEN SATURATION: 100 % | TEMPERATURE: 98 F | HEART RATE: 53 BPM | DIASTOLIC BLOOD PRESSURE: 94 MMHG | SYSTOLIC BLOOD PRESSURE: 148 MMHG

## 2024-10-02 DIAGNOSIS — M47.814 THORACIC SPONDYLOSIS WITHOUT MYELOPATHY: ICD-10-CM

## 2024-10-02 PROCEDURE — 27000284 HC CANNULA NASAL

## 2024-10-02 PROCEDURE — 64490 INJ PARAVERT F JNT C/T 1 LEV: CPT | Mod: 50 | Performed by: ANESTHESIOLOGY

## 2024-10-02 PROCEDURE — 27000716 HC OXISENSOR PROBE, ANY SIZE

## 2024-10-02 PROCEDURE — 64492 INJ PARAVERT F JNT C/T 3 LEV: CPT | Mod: 50 | Performed by: ANESTHESIOLOGY

## 2024-10-02 PROCEDURE — 25000003 PHARM REV CODE 250: Performed by: ANESTHESIOLOGY

## 2024-10-02 PROCEDURE — 64491 INJ PARAVERT F JNT C/T 2 LEV: CPT | Mod: 50 | Performed by: ANESTHESIOLOGY

## 2024-10-02 PROCEDURE — 37000008 HC ANESTHESIA 1ST 15 MINUTES: Performed by: ANESTHESIOLOGY

## 2024-10-02 PROCEDURE — 63600175 PHARM REV CODE 636 W HCPCS: Mod: JG | Performed by: ANESTHESIOLOGY

## 2024-10-02 PROCEDURE — 63600175 PHARM REV CODE 636 W HCPCS

## 2024-10-02 RX ORDER — LIDOCAINE HYDROCHLORIDE 20 MG/ML
INJECTION INTRAVENOUS
Status: DISCONTINUED | OUTPATIENT
Start: 2024-10-02 | End: 2024-10-02

## 2024-10-02 RX ORDER — PROPOFOL 10 MG/ML
VIAL (ML) INTRAVENOUS
Status: DISCONTINUED | OUTPATIENT
Start: 2024-10-02 | End: 2024-10-02

## 2024-10-02 RX ORDER — BUPIVACAINE HYDROCHLORIDE 2.5 MG/ML
INJECTION, SOLUTION INFILTRATION; PERINEURAL CODE/TRAUMA/SEDATION MEDICATION
Status: DISCONTINUED | OUTPATIENT
Start: 2024-10-02 | End: 2024-10-02 | Stop reason: HOSPADM

## 2024-10-02 RX ORDER — SODIUM CHLORIDE 9 MG/ML
500 INJECTION, SOLUTION INTRAVENOUS CONTINUOUS
Status: DISCONTINUED | OUTPATIENT
Start: 2024-10-02 | End: 2024-10-02 | Stop reason: HOSPADM

## 2024-10-02 RX ORDER — TRIAMCINOLONE ACETONIDE 40 MG/ML
INJECTION, SUSPENSION INTRA-ARTICULAR; INTRAMUSCULAR CODE/TRAUMA/SEDATION MEDICATION
Status: DISCONTINUED | OUTPATIENT
Start: 2024-10-02 | End: 2024-10-02 | Stop reason: HOSPADM

## 2024-10-02 RX ADMIN — PROPOFOL 40 MG: 10 INJECTION, EMULSION INTRAVENOUS at 09:10

## 2024-10-02 RX ADMIN — LIDOCAINE HYDROCHLORIDE 100 MG: 20 INJECTION, SOLUTION INTRAVENOUS at 09:10

## 2024-10-02 RX ADMIN — SODIUM CHLORIDE: 9 INJECTION, SOLUTION INTRAVENOUS at 09:10

## 2024-10-02 RX ADMIN — PROPOFOL 100 MG: 10 INJECTION, EMULSION INTRAVENOUS at 09:10

## 2024-10-02 NOTE — OP NOTE
10/02/2024  [facility] 1965    PREOPERATIVE DIAGNOSIS:     Thoracic Spondylosis without myelopathy                                                         Thoracic back pain     POSTOPERATIVE DIAGNOSIS:  Thoracic  Spondylosis without myelopathy                                                         Thoracic back pain     PROCEDURE:  Bilateral Thoracic  7-10 Facet Injection under Fluoroscopic Guidance     SURGEON:Dr. Beka Deluna  ANESTHESIA:  MAC                        COMPLICATIONS:  None  DRAINS AND PACKS:  None          BLOOD LOSS:  None     Patient was identified in the holding area.  The risk and benefits of the procedure were again explained to the patient and the patient agreed to proceed.  Consent form was signed and obtained.  Neck was marked with a skin pen.  The patient was taken in stable condition to the operating room and was placed prone on the C-arm table.  All pressure points were checked and padded comfortably while the patient was awake.  The neck was then prepped and draped in the usual sterile fashion. Standard ASA monitors were applied.  Anesthesia was initiated.  The C-arm was brought into the true AP position, time-out was completed and standard ASA monitors were applied throughout the procedure.  Under direct fluoroscopic guidance a 22-gauge 3-1/2-inch needle was advanced to anesthetize the skin, which was accomplished by using 2mL of 0.25% (2.5mg/ml)  Bupivacaine, anesthetizing down to deeper paraspinous muscles, structures in the neck.  Under direct fluoroscopic guidance the needle was advanced to the T7, T8, T9 and T10 facets on left side.  The patient tolerated the procedure well with no adverse events.  There were no paresthesias with needle placement or injection.  At each level a 1.5-mL allotment of a solution that contained Kenalog 40mg/ml 1/2 ml diluted into 8mL of 0.25%(2.5mg/ml) Bupivacaine was used without complication.  The procedure was repeated on the right as  described above. The needle was removed with the tip intact.  Patient tolerated the procedure well, no adverse event, and was taken in stable condition to the holding area and was monitored for the appropriate time of convalescence.  The patient was discharged home in the care of the patients  with no complications.      The preoperative pain score was  10/10.   The postoperative pain score  /10. Pipa %

## 2024-10-02 NOTE — TRANSFER OF CARE
"Anesthesia Transfer of Care Note    Patient: Andrez Wen    Procedure(s) Performed: Procedure(s) (LRB):  INJECTION, FACET JOINT (Bilateral)    Patient location: Other: [facility]    Anesthesia Type: MAC    Transport from OR: Transported from OR on room air with adequate spontaneous ventilation    Post pain: adequate analgesia    Post assessment: no apparent anesthetic complications    Post vital signs: stable    Level of consciousness: sedated and responds to stimulation    Nausea/Vomiting: no nausea/vomiting    Complications: none    Transfer of care protocol was followedComments: Good SV continue, NAD noted, VSS, RTRN      Last vitals: Visit Vitals  BP (!) 145/92   Pulse (!) 58   Temp 36.7 °C (98 °F) (Oral)   Resp 14   Ht 5' 11" (1.803 m)   Wt 97.1 kg (214 lb)   SpO2 97%   BMI 29.85 kg/m²     "

## 2024-10-02 NOTE — DISCHARGE SUMMARY
Patient underwent  Bilateral Thoracic  7-10 Facet Injection under Fluoroscopic Guidance    procedure 10/02/2024. The pt will follow up in clinic. Discharged home. Discharge Dx: Thoracic Spondylosis without myelopathy

## 2024-10-02 NOTE — PLAN OF CARE
Plan:  D/c pt via wheelchair at 1005  Informed pt if does not void in 8 hours to go to ER. Notify if redness, drainage, from injection site or fever over next 3-4 days. Rest and drink plenty of fluids for the remainder of the day. No lifting over 5 lbs. For the remainder of the day. Continue regular medications as prescribed. May take pain medications as prescribed.     Pain improved 30%  Pre-procedure pain: 10  Post-procedure pain: 7

## 2024-10-02 NOTE — ANESTHESIA POSTPROCEDURE EVALUATION
Anesthesia Post Evaluation    Patient: Andrez Wen    Procedure(s) Performed: Procedure(s) (LRB):  INJECTION, FACET JOINT (Bilateral)    Final Anesthesia Type: MAC      Patient location: [facility].  Patient participation: Yes- Able to Participate  Level of consciousness: awake and alert  Post-procedure vital signs: reviewed and stable  Pain management: adequate  Airway patency: patent    PONV status at discharge: No PONV  Anesthetic complications: no      Cardiovascular status: blood pressure returned to baseline, hemodynamically stable and stable  Respiratory status: unassisted  Hydration status: euvolemic  Follow-up not needed.  Comments: Pt voices appreciation for care              Vitals Value Taken Time   /94 10/02/24 1000   Temp 98 F 10/02/24 1035   Pulse 53 10/02/24 1000   Resp 15 10/02/24 1000   SpO2 100 % 10/02/24 1000         Event Time   Out of Recovery 10:00:34         Pain/Yessy Score: Yessy Score: 10 (10/2/2024 10:00 AM)

## 2024-10-02 NOTE — H&P
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 - Thoracic spondylosis without myelopathy  No Sedation      4. Procedure instructions given to pt who verbalized understanding. Procedure sheet given to the patient after verbally voicing understanding of the sheet concerning blood thinners, NPO status, and importance of a .     5. Monitored Anesthesia Care medical necessity authorization request:       Monitor anesthesia request is medically indicated for the scheduled ___Thoracic FI _____procedure due to:     - needle phobia and anxiety, placing the patient at risk during the provided service._____  - patient has a BMI greater than 45 ____  - patient has severe sleep apnea for which BiPAP and oxygen are needed while sleeping._____  - patient is unable to follow simple commands due to mental state.____  - patient has an ASA class greater than 3 and requires constant presence of an anesthesiologist/CRNA during the procedure.____  - patient has severe problems with muscles and muscle spasticity that makes it hard to lie still. ____  - patient suffers from chronic pain and is unable to function due to diminished ADL's.____  - patient is dependent on opioids or sedatives.____  - Other _x___     NARCOTIC STATEMENT  Patient is taking the narcotic pain medications as  prescribed. Refill is being given because of the benefit to the patient in regards to the pain. Patient has agreed not to abuse medication and not to take it more than what is prescribed. The nature of the drug including the potential for addiction and dependency and abuse was also discussed with the patient. Patient has developed physical dependency for the narcotic pain medication for his pain relief.  Patient has also developed tolerance to the sedative effect of the narcotic pain medications.  Patient has decided to continue with these medications despite potential for addiction as described by this office.  This was stressed to the patient that it is the patient's responsibility to secure the narcotic medication and in any event of loss for any reason whatsoever,  there will be no refill before the next due date. Patient also understands that they are not supposed to drive or work on machinery while taking these medications.  Also explained to the patient that in the event of traffic citation, the presence of this drug in  bloodstream may result in DUI.  Patient has been advised not to drink alcohol while taking this medication.  Patient has verbalized understanding of our office policy and has signed a contract with us in this regard.        Compliance Statement:  Documented by Cathy Kc RN acting as a scribe for Beka Deluna M.D. The note accurately reflects work and decisions performed by me.                 Beka Deluna MD      Electronically signed: 9/24/2024 4:11:01 PM           Chief Complaint:      HPI:       Assessment/Plan:         Beka Deluna MD  Pain Management  [facility] - Pain Management

## 2024-10-02 NOTE — ANESTHESIA PREPROCEDURE EVALUATION
10/02/2024  Andrez Wen is a 59 y.o., male.    No current facility-administered medications on file prior to encounter.     Current Outpatient Medications on File Prior to Encounter   Medication Sig Dispense Refill    buPROPion (WELLBUTRIN SR) 150 MG TBSR 12 hr tablet Take 1 tablet (150 mg total) by mouth 2 (two) times daily. 60 tablet 11    EScitalopram oxalate (LEXAPRO) 5 MG Tab Take 1 tablet by mouth once daily 30 tablet 5    gabapentin (NEURONTIN) 300 MG capsule Take 2 capsules (600 mg total) by mouth 2 (two) times daily. 360 capsule 1    HYDROcodone-acetaminophen (NORCO) 7.5-325 mg per tablet Take 1 tablet by mouth 2 (two) times daily as needed.      pantoprazole (PROTONIX) 40 MG tablet TAKE 1 TABLET BY MOUTH ONCE DAILY 90 tablet 1    rosuvastatin (CRESTOR) 20 MG tablet TAKE 1 TABLET BY MOUTH ONCE DAILY IN THE EVENING 90 tablet 1    albuterol (PROVENTIL) 2.5 mg /3 mL (0.083 %) nebulizer solution Take 3 mLs (2.5 mg total) by nebulization 2 (two) times a day. 90 mL 2    albuterol (PROVENTIL/VENTOLIN HFA) 90 mcg/actuation inhaler INHALE 2 PUFFS BY MOUTH EVERY 4 HOURS 18 g 2    budesonide-glycopyr-formoterol (BREZTRI AEROSPHERE) 160-9-4.8 mcg/actuation HFAA Inhale 2 puffs into the lungs 2 (two) times daily. 11 g 2    silver sulfADIAZINE 1% (SILVADENE) 1 % cream APPLY EXTERNALLY TWICE DAILY TO LEGS 400 g 10    tiZANidine (ZANAFLEX) 4 MG tablet Take 1 tablet (4 mg total) by mouth every 8 (eight) hours. 270 tablet 0     Active Ambulatory Problems     Diagnosis Date Noted    Asthma-COPD overlap syndrome 03/10/2022    Hyperlipidemia 03/10/2022    Gastroesophageal reflux disease 03/10/2022    Anxiety 03/10/2022    Fatigue 01/02/2023    Irregular heart beat 01/02/2023    Congestive heart failure 01/02/2023    Exertional dyspnea 01/02/2023    Intermittent palpitations 01/02/2023    Morbid obesity 01/02/2023     Tobacco abuse disorder 01/02/2023    Obstructive sleep apnea of adult 01/02/2023    Traumatic complete tear of right rotator cuff 06/21/2023    Smoking greater than 40 pack years 12/15/2023    Nicotine dependence with current use 12/15/2023    Chronic cough 12/15/2023    Colon cancer screening 02/21/2024     Resolved Ambulatory Problems     Diagnosis Date Noted    No Resolved Ambulatory Problems     Past Medical History:   Diagnosis Date    Chronic heart failure     COPD (chronic obstructive pulmonary disease)      .surg    Pre-op Assessment    I have reviewed the Patient Summary Reports.     I have reviewed the Nursing Notes. I have reviewed the NPO Status.   I have reviewed the Medications.     Review of Systems  Anesthesia Hx:  No problems with previous Anesthesia             Denies Family Hx of Anesthesia complications.    Denies Personal Hx of Anesthesia complications.                    Social:  Smoker, Recreational Drugs, Social Alcohol Use Marijuana       Hematology/Oncology:  Hematology Normal   Oncology Normal                                   EENT/Dental:  EENT/Dental Normal           Cardiovascular:  Exercise tolerance: good       Dysrhythmias   CHF    hyperlipidemia   ECG has been reviewed.                          Pulmonary:   COPD Asthma  Shortness of breath  Sleep Apnea                Renal/:  Renal/ Normal                 Hepatic/GI:     GERD             Musculoskeletal:  Musculoskeletal Normal                Neurological:  Neurology Normal                                      Endocrine:  Endocrine Normal            Dermatological:  Skin Normal    Psych:   anxiety                 Chemistry        Component Value Date/Time     05/23/2024 0925    K 4.5 05/24/2024 1054     05/23/2024 0925    CO2 31 05/23/2024 0925    BUN 10 05/23/2024 0925    CREATININE 0.99 05/23/2024 0925    GLU 82 05/23/2024 0925        Component Value Date/Time    CALCIUM 9.7 05/23/2024 0925    ALKPHOS 110  05/23/2024 0925    AST 21 05/23/2024 0925    ALT 24 05/23/2024 0925    BILITOT 0.7 05/23/2024 0925    ESTGFRAFRICA 114 08/10/2020 1500    EGFRNONAA 75 03/10/2022 1400        Lab Results   Component Value Date    WBC 10.77 05/23/2024    HGB 15.7 05/23/2024    HCT 53.1 05/23/2024    MCV 96.2 (H) 05/23/2024     05/23/2024       Results for orders placed or performed in visit on 12/15/22   EKG 12-lead    Collection Time: 12/15/22  9:48 AM    Narrative    Test Reason : R07.9,    Vent. Rate : 066 BPM     Atrial Rate : 066 BPM     P-R Int : 170 ms          QRS Dur : 094 ms      QT Int : 392 ms       P-R-T Axes : 075 082 081 degrees     QTc Int : 410 ms    Normal sinus rhythm  Normal ECG  No previous ECGs available  Confirmed by Benito Padilla DO ([id number]) on 12/26/2022 5:57:17 AM    Referred By: EDWIN LOUIS           Confirmed By:Benito Padilla DO       Physical Exam  General: Well nourished, Cooperative, Alert and Oriented    Airway:  Mallampati: II   Mouth Opening: Normal  TM Distance: Normal  Tongue: Normal  Neck ROM: Normal ROM    Dental:  Dentures    Chest/Lungs:  Normal Respiratory Rate        Anesthesia Plan  Type of Anesthesia, risks & benefits discussed:    Anesthesia Type: MAC  Intra-op Monitoring Plan: Standard ASA Monitors  Post Op Pain Control Plan: multimodal analgesia  Induction:  IV  Informed Consent: Informed consent signed with the Patient and all parties understand the risks and agree with anesthesia plan.  All questions answered. Patient consented to blood products? Yes  ASA Score: 3  Day of Surgery Review of History & Physical: H&P Update referred to the surgeon/provider.I have interviewed and examined the patient. I have reviewed the patient's H&P dated: There are no significant changes.     Ready For Surgery From Anesthesia Perspective.     .

## 2024-10-03 ENCOUNTER — OFFICE VISIT (OUTPATIENT)
Dept: FAMILY MEDICINE | Facility: CLINIC | Age: 59
End: 2024-10-03
Payer: MEDICARE

## 2024-10-03 VITALS
HEIGHT: 71 IN | TEMPERATURE: 98 F | BODY MASS INDEX: 29.85 KG/M2 | SYSTOLIC BLOOD PRESSURE: 138 MMHG | RESPIRATION RATE: 20 BRPM | OXYGEN SATURATION: 94 % | HEART RATE: 62 BPM | DIASTOLIC BLOOD PRESSURE: 82 MMHG

## 2024-10-03 DIAGNOSIS — Z23 NEED FOR PROPHYLACTIC VACCINATION AGAINST STREPTOCOCCUS PNEUMONIAE (PNEUMOCOCCUS): ICD-10-CM

## 2024-10-03 DIAGNOSIS — F17.210 SMOKING GREATER THAN 40 PACK YEARS: Primary | ICD-10-CM

## 2024-10-03 DIAGNOSIS — Z23 NEED FOR IMMUNIZATION AGAINST INFLUENZA: ICD-10-CM

## 2024-10-03 PROCEDURE — G0009 ADMIN PNEUMOCOCCAL VACCINE: HCPCS | Mod: ,,, | Performed by: FAMILY MEDICINE

## 2024-10-03 PROCEDURE — 90661 CCIIV3 VAC ABX FR 0.5 ML IM: CPT | Mod: ,,, | Performed by: FAMILY MEDICINE

## 2024-10-03 PROCEDURE — G0008 ADMIN INFLUENZA VIRUS VAC: HCPCS | Mod: ,,, | Performed by: FAMILY MEDICINE

## 2024-10-03 PROCEDURE — 99213 OFFICE O/P EST LOW 20 MIN: CPT | Mod: 25,,, | Performed by: FAMILY MEDICINE

## 2024-10-03 PROCEDURE — 1159F MED LIST DOCD IN RCRD: CPT | Mod: ,,, | Performed by: FAMILY MEDICINE

## 2024-10-03 PROCEDURE — 3079F DIAST BP 80-89 MM HG: CPT | Mod: ,,, | Performed by: FAMILY MEDICINE

## 2024-10-03 PROCEDURE — 1160F RVW MEDS BY RX/DR IN RCRD: CPT | Mod: ,,, | Performed by: FAMILY MEDICINE

## 2024-10-03 PROCEDURE — 90677 PCV20 VACCINE IM: CPT | Mod: ,,, | Performed by: FAMILY MEDICINE

## 2024-10-03 PROCEDURE — 3075F SYST BP GE 130 - 139MM HG: CPT | Mod: ,,, | Performed by: FAMILY MEDICINE

## 2024-10-03 PROCEDURE — 3008F BODY MASS INDEX DOCD: CPT | Mod: ,,, | Performed by: FAMILY MEDICINE

## 2024-10-03 NOTE — PROGRESS NOTES
Andrez Wen is a 59 y.o. male seen today for follow-up on his COPD and hyperlipidemia.  In July his lipids were to goal and the patient reports no chest pain but has intermittent shortness a breath especially with exertion.  Patient is down to 60 10 cigarettes daily and I have encouraged him to continue to decrease the intake of cigarette smoke.  He is due for his low-dose CT scan as well as his flu vaccination and Prevnar 20.      Past Medical History:   Diagnosis Date    Chronic heart failure     COPD (chronic obstructive pulmonary disease)      Family History   Family history unknown: Yes     Current Outpatient Medications on File Prior to Visit   Medication Sig Dispense Refill    albuterol (PROVENTIL) 2.5 mg /3 mL (0.083 %) nebulizer solution Take 3 mLs (2.5 mg total) by nebulization 2 (two) times a day. 90 mL 2    albuterol (PROVENTIL/VENTOLIN HFA) 90 mcg/actuation inhaler INHALE 2 PUFFS BY MOUTH EVERY 4 HOURS 18 g 2    budesonide-glycopyr-formoterol (BREZTRI AEROSPHERE) 160-9-4.8 mcg/actuation HFAA Inhale 2 puffs into the lungs 2 (two) times daily. 11 g 2    buPROPion (WELLBUTRIN SR) 150 MG TBSR 12 hr tablet Take 1 tablet (150 mg total) by mouth 2 (two) times daily. 60 tablet 11    EScitalopram oxalate (LEXAPRO) 5 MG Tab Take 1 tablet by mouth once daily 30 tablet 5    gabapentin (NEURONTIN) 300 MG capsule Take 2 capsules (600 mg total) by mouth 2 (two) times daily. 360 capsule 1    HYDROcodone-acetaminophen (NORCO) 7.5-325 mg per tablet Take 1 tablet by mouth 2 (two) times daily as needed.      pantoprazole (PROTONIX) 40 MG tablet TAKE 1 TABLET BY MOUTH ONCE DAILY 90 tablet 1    rosuvastatin (CRESTOR) 20 MG tablet TAKE 1 TABLET BY MOUTH ONCE DAILY IN THE EVENING 90 tablet 1    silver sulfADIAZINE 1% (SILVADENE) 1 % cream APPLY EXTERNALLY TWICE DAILY TO LEGS 400 g 10    tiZANidine (ZANAFLEX) 4 MG tablet Take 1 tablet (4 mg total) by mouth every 8 (eight) hours. 270 tablet 0     Current  Facility-Administered Medications on File Prior to Visit   Medication Dose Route Frequency Provider Last Rate Last Admin    [DISCONTINUED] 0.9%  NaCl infusion  500 mL Intravenous Continuous Beka Deluna MD   New Bag at 10/02/24 0917     Immunization History   Administered Date(s) Administered    COVID-19 Vaccine 02/26/2021    COVID-19, MRNA, LN-S, PF (MODERNA FULL 0.5 ML DOSE) 02/26/2021, 03/26/2021       Review of Systems   Constitutional:  Negative for fever, malaise/fatigue and weight loss.   Respiratory:  Positive for cough, shortness of breath and wheezing.    Cardiovascular:  Negative for chest pain and palpitations.   Gastrointestinal:  Negative for nausea and vomiting.   Psychiatric/Behavioral:  Negative for depression.         Vitals:    10/03/24 1056   BP: 138/82   Pulse: 62   Resp: 20   Temp: 98.1 °F (36.7 °C)       Physical Exam  Vitals reviewed.   Constitutional:       Appearance: Normal appearance.   HENT:      Head: Normocephalic.   Eyes:      Extraocular Movements: Extraocular movements intact.      Conjunctiva/sclera: Conjunctivae normal.      Pupils: Pupils are equal, round, and reactive to light.   Neck:      Thyroid: No thyroid mass or thyromegaly.   Cardiovascular:      Rate and Rhythm: Normal rate and regular rhythm.      Heart sounds: Normal heart sounds. No murmur heard.     No gallop.   Pulmonary:      Effort: Pulmonary effort is normal. No respiratory distress.      Breath sounds: Decreased air movement present. Decreased breath sounds and wheezing present. No rales.   Skin:     General: Skin is warm and dry.      Coloration: Skin is not jaundiced or pale.   Neurological:      Mental Status: He is alert.   Psychiatric:         Mood and Affect: Mood normal.         Behavior: Behavior normal.         Thought Content: Thought content normal.         Judgment: Judgment normal.          Assessment and Plan  1. Smoking greater than 40 pack years  -     CT Chest Lung Screening Low Dose;  Future; Expected date: 10/03/2024    2. Need for immunization against influenza  -     influenza (Egg-FREE) (Flucelvax) 45 mcg/0.5 mL IM vaccine (> or = 6 mo) 0.5 mL    3. Need for prophylactic vaccination against Streptococcus pneumoniae (pneumococcus)  -     pneumoc 20-irwin conj-dip cr(PF) (PREVNAR-20 (PF)) injection Syrg 0.5 mL             Return to clinic in 6 months or as needed    Health Maintenance Topics with due status: Not Due       Topic Last Completion Date    LDCT Lung Screen 01/23/2024    Colorectal Cancer Screening 02/21/2024    Lipid Panel 07/16/2024

## 2024-10-04 DIAGNOSIS — G89.29 CHRONIC BILATERAL LOW BACK PAIN WITHOUT SCIATICA: ICD-10-CM

## 2024-10-04 DIAGNOSIS — J44.9 CHRONIC OBSTRUCTIVE PULMONARY DISEASE, UNSPECIFIED COPD TYPE: ICD-10-CM

## 2024-10-04 DIAGNOSIS — M54.50 CHRONIC BILATERAL LOW BACK PAIN WITHOUT SCIATICA: ICD-10-CM

## 2024-10-07 RX ORDER — BUDESONIDE, GLYCOPYRROLATE, AND FORMOTEROL FUMARATE 160; 9; 4.8 UG/1; UG/1; UG/1
2 AEROSOL, METERED RESPIRATORY (INHALATION) 2 TIMES DAILY
Qty: 10.7 G | Refills: 5 | Status: SHIPPED | OUTPATIENT
Start: 2024-10-07

## 2024-10-07 RX ORDER — ALBUTEROL SULFATE 90 UG/1
AEROSOL, METERED RESPIRATORY (INHALATION)
Qty: 18 G | Refills: 5 | Status: SHIPPED | OUTPATIENT
Start: 2024-10-07

## 2024-10-07 RX ORDER — TIZANIDINE 4 MG/1
4 TABLET ORAL EVERY 8 HOURS
Qty: 270 TABLET | Refills: 1 | Status: SHIPPED | OUTPATIENT
Start: 2024-10-07

## 2024-10-22 ENCOUNTER — HOSPITAL ENCOUNTER (OUTPATIENT)
Dept: RADIOLOGY | Facility: HOSPITAL | Age: 59
Discharge: HOME | End: 2024-10-22
Attending: FAMILY MEDICINE
Payer: MEDICARE

## 2024-10-22 VITALS — BODY MASS INDEX: 29.96 KG/M2 | HEIGHT: 71 IN | WEIGHT: 214 LBS

## 2024-10-22 DIAGNOSIS — F17.210 SMOKING GREATER THAN 40 PACK YEARS: ICD-10-CM

## 2024-10-22 PROCEDURE — 71271 CT THORAX LUNG CANCER SCR C-: CPT | Mod: 26,,, | Performed by: RADIOLOGY

## 2024-10-22 PROCEDURE — 71271 CT THORAX LUNG CANCER SCR C-: CPT | Mod: TC

## 2024-10-23 ENCOUNTER — TELEPHONE (OUTPATIENT)
Dept: FAMILY MEDICINE | Facility: CLINIC | Age: 59
End: 2024-10-23
Payer: MEDICARE

## 2024-10-23 NOTE — TELEPHONE ENCOUNTER
----- Message from Herminio Gee MD sent at 10/23/2024 11:23 AM CDT -----  He has some small nodules but nothing worrisome and should have a repeat scan in 1 year.

## 2024-10-23 NOTE — TELEPHONE ENCOUNTER
Patient notified that He has some small nodules but nothing worrisome and should have a repeat scan in 1 year. Patient verbalized understanding.

## 2024-10-31 ENCOUNTER — OFFICE VISIT (OUTPATIENT)
Dept: FAMILY MEDICINE | Facility: CLINIC | Age: 59
End: 2024-10-31
Payer: MEDICARE

## 2024-10-31 VITALS
RESPIRATION RATE: 14 BRPM | OXYGEN SATURATION: 92 % | WEIGHT: 209 LBS | BODY MASS INDEX: 29.26 KG/M2 | DIASTOLIC BLOOD PRESSURE: 80 MMHG | HEART RATE: 92 BPM | TEMPERATURE: 98 F | SYSTOLIC BLOOD PRESSURE: 139 MMHG | HEIGHT: 71 IN

## 2024-10-31 DIAGNOSIS — J44.9 CHRONIC OBSTRUCTIVE PULMONARY DISEASE, UNSPECIFIED COPD TYPE: ICD-10-CM

## 2024-10-31 DIAGNOSIS — E78.2 MIXED HYPERLIPIDEMIA: ICD-10-CM

## 2024-10-31 DIAGNOSIS — Z11.59 ENCOUNTER FOR HEPATITIS C SCREENING TEST FOR LOW RISK PATIENT: ICD-10-CM

## 2024-10-31 DIAGNOSIS — K21.9 GASTROESOPHAGEAL REFLUX DISEASE WITHOUT ESOPHAGITIS: ICD-10-CM

## 2024-10-31 DIAGNOSIS — Z12.5 ENCOUNTER FOR SCREENING PROSTATE SPECIFIC ANTIGEN (PSA) MEASUREMENT: ICD-10-CM

## 2024-10-31 DIAGNOSIS — Z00.00 ENCOUNTER FOR SUBSEQUENT ANNUAL WELLNESS VISIT (AWV) IN MEDICARE PATIENT: Primary | ICD-10-CM

## 2024-10-31 LAB
HCV AB SER QL: NORMAL
PSA SERPL-MCNC: 1.24 NG/ML

## 2024-10-31 PROCEDURE — 3079F DIAST BP 80-89 MM HG: CPT | Mod: ,,, | Performed by: NURSE PRACTITIONER

## 2024-10-31 PROCEDURE — 3075F SYST BP GE 130 - 139MM HG: CPT | Mod: ,,, | Performed by: NURSE PRACTITIONER

## 2024-10-31 PROCEDURE — G0439 PPPS, SUBSEQ VISIT: HCPCS | Mod: ,,, | Performed by: NURSE PRACTITIONER

## 2024-10-31 PROCEDURE — 1159F MED LIST DOCD IN RCRD: CPT | Mod: ,,, | Performed by: NURSE PRACTITIONER

## 2024-11-20 ENCOUNTER — EXTERNAL HOME HEALTH (OUTPATIENT)
Dept: HOME HEALTH SERVICES | Facility: HOSPITAL | Age: 59
End: 2024-11-20
Payer: MEDICARE

## 2024-12-04 DIAGNOSIS — G89.29 CHRONIC BILATERAL LOW BACK PAIN WITHOUT SCIATICA: ICD-10-CM

## 2024-12-04 DIAGNOSIS — M54.50 CHRONIC BILATERAL LOW BACK PAIN WITHOUT SCIATICA: ICD-10-CM

## 2024-12-05 RX ORDER — GABAPENTIN 300 MG/1
600 CAPSULE ORAL 2 TIMES DAILY
Qty: 120 CAPSULE | Refills: 10 | Status: SHIPPED | OUTPATIENT
Start: 2024-12-05

## 2025-01-27 DIAGNOSIS — J44.9 CHRONIC OBSTRUCTIVE PULMONARY DISEASE, UNSPECIFIED COPD TYPE: Primary | ICD-10-CM

## 2025-01-27 DIAGNOSIS — G47.33 OSA (OBSTRUCTIVE SLEEP APNEA): ICD-10-CM

## 2025-02-04 DIAGNOSIS — E78.2 MIXED HYPERLIPIDEMIA: ICD-10-CM

## 2025-02-04 DIAGNOSIS — K21.9 GASTROESOPHAGEAL REFLUX DISEASE WITHOUT ESOPHAGITIS: ICD-10-CM

## 2025-02-05 RX ORDER — ROSUVASTATIN CALCIUM 20 MG/1
20 TABLET, COATED ORAL
Qty: 30 TABLET | Refills: 10 | Status: SHIPPED | OUTPATIENT
Start: 2025-02-05

## 2025-02-05 RX ORDER — PANTOPRAZOLE SODIUM 40 MG/1
40 TABLET, DELAYED RELEASE ORAL
Qty: 30 TABLET | Refills: 10 | Status: SHIPPED | OUTPATIENT
Start: 2025-02-05

## 2025-03-03 RX ORDER — PREGABALIN 75 MG/1
75 CAPSULE ORAL 3 TIMES DAILY
COMMUNITY
Start: 2025-01-29

## 2025-03-04 ENCOUNTER — OFFICE VISIT (OUTPATIENT)
Dept: PULMONOLOGY | Facility: CLINIC | Age: 60
End: 2025-03-04
Payer: MEDICARE

## 2025-03-04 VITALS
SYSTOLIC BLOOD PRESSURE: 120 MMHG | OXYGEN SATURATION: 92 % | BODY MASS INDEX: 29.26 KG/M2 | RESPIRATION RATE: 16 BRPM | WEIGHT: 209 LBS | DIASTOLIC BLOOD PRESSURE: 80 MMHG | HEART RATE: 60 BPM | HEIGHT: 71 IN

## 2025-03-04 DIAGNOSIS — J44.89 ASTHMA-COPD OVERLAP SYNDROME: Primary | ICD-10-CM

## 2025-03-04 DIAGNOSIS — G47.33 OSA (OBSTRUCTIVE SLEEP APNEA): ICD-10-CM

## 2025-03-04 DIAGNOSIS — F41.1 ANXIETY, GENERALIZED: ICD-10-CM

## 2025-03-04 DIAGNOSIS — J44.9 CHRONIC OBSTRUCTIVE PULMONARY DISEASE, UNSPECIFIED COPD TYPE: ICD-10-CM

## 2025-03-04 PROCEDURE — 1159F MED LIST DOCD IN RCRD: CPT | Mod: CPTII,,, | Performed by: STUDENT IN AN ORGANIZED HEALTH CARE EDUCATION/TRAINING PROGRAM

## 2025-03-04 PROCEDURE — 99215 OFFICE O/P EST HI 40 MIN: CPT | Mod: PBBFAC | Performed by: STUDENT IN AN ORGANIZED HEALTH CARE EDUCATION/TRAINING PROGRAM

## 2025-03-04 PROCEDURE — 3079F DIAST BP 80-89 MM HG: CPT | Mod: CPTII,,, | Performed by: STUDENT IN AN ORGANIZED HEALTH CARE EDUCATION/TRAINING PROGRAM

## 2025-03-04 PROCEDURE — 3008F BODY MASS INDEX DOCD: CPT | Mod: CPTII,,, | Performed by: STUDENT IN AN ORGANIZED HEALTH CARE EDUCATION/TRAINING PROGRAM

## 2025-03-04 PROCEDURE — 3074F SYST BP LT 130 MM HG: CPT | Mod: CPTII,,, | Performed by: STUDENT IN AN ORGANIZED HEALTH CARE EDUCATION/TRAINING PROGRAM

## 2025-03-04 PROCEDURE — 99999 PR PBB SHADOW E&M-EST. PATIENT-LVL V: CPT | Mod: PBBFAC,,, | Performed by: STUDENT IN AN ORGANIZED HEALTH CARE EDUCATION/TRAINING PROGRAM

## 2025-03-04 PROCEDURE — 1160F RVW MEDS BY RX/DR IN RCRD: CPT | Mod: CPTII,,, | Performed by: STUDENT IN AN ORGANIZED HEALTH CARE EDUCATION/TRAINING PROGRAM

## 2025-03-04 PROCEDURE — 99214 OFFICE O/P EST MOD 30 MIN: CPT | Mod: S$PBB,,, | Performed by: STUDENT IN AN ORGANIZED HEALTH CARE EDUCATION/TRAINING PROGRAM

## 2025-03-04 RX ORDER — BUDESONIDE, GLYCOPYRROLATE, AND FORMOTEROL FUMARATE 160; 9; 4.8 UG/1; UG/1; UG/1
2 AEROSOL, METERED RESPIRATORY (INHALATION) 2 TIMES DAILY
Qty: 10.7 G | Refills: 5 | Status: SHIPPED | OUTPATIENT
Start: 2025-03-04

## 2025-03-04 RX ORDER — BUSPIRONE HYDROCHLORIDE 5 MG/1
5 TABLET ORAL 2 TIMES DAILY
Qty: 60 TABLET | Refills: 11 | Status: SHIPPED | OUTPATIENT
Start: 2025-03-04 | End: 2026-03-04

## 2025-03-04 NOTE — PROGRESS NOTES
[facility]  Pulmonology  ESTABLISHED VISIT     Patient Name:  Andrez Wen  Primary Care Provider: Herminio Gee MD  Date of Service: 03/04/2025  Reason for Referral:  COPD      Chief Complaint:  Shortness of breath    SUBJECTIVE   HPI:  Andrez Wen is a 59 y.o. male with CHF, asthma/COPD overlap, JANE (on APAP 5-20; average 12), ongoing nicotine dependence and chronic back pain who presents today for follow up of shortness of breath. Last seen 01/2024 with plan for LDCT baseline, PFT and 6MWT.    Bettye reports increased breathlessness with exertion. This has began in the past two weeks. He has associated anxiousness with his breathlessness that leaves him in a panic. This particularly presents overnight on his APAP where he has to rip off his mask and quickly take inhalers. He is currently prescribed Breztri takes twice daily and has increased his rescue inhaler use to 4-5 times daily.  We reviewed his previous diagnosis and discussed his asthma component as well.  Sleep Medicine OSH records reviewed.    Initial HPI  Bettye characterizes his shortness of breath as dyspnea with exertion.  He has a sole caretaker of his daughter who has functional  neuromuscular limitations.  He has a cough which he attributes to his smoking.  He currently manages his dyspnea with albuterol with brief improvement in his shortness of breath. He denies any hospitalizations this year.  He is currently managed with respiratory inhaler which he uses twice daily and albuterol as needed.  He reports prior oxygen therapy.  He has a sleep apnea diagnosis but seldom uses his CPAP.    01/2024: reports having shortness of breath with walking short distances. This is stable in severity. He remains able to participate with care for his daughter. The biggest limitation for him at this time is his chronic back pain. He has a stable cough that is at time productive.       Past Medical History:   Diagnosis Date    Chronic  heart failure     COPD (chronic obstructive pulmonary disease)        Past Surgical History:   Procedure Laterality Date    INJECTION OF FACET JOINT Bilateral 10/2/2024    Procedure: INJECTION, FACET JOINT;  Surgeon: Beka Deluna MD;  Location: [facility];  Service: Pain Management;  Laterality: Bilateral;  Natan T7-10 FI       Family History   Family history unknown: Yes        Social History     Socioeconomic History    Marital status:    Tobacco Use    Smoking status: Every Day     Current packs/day: 0.25     Average packs/day: 2.2 packs/day for 51.2 years (111.8 ttl pk-yrs)     Types: Cigarettes     Start date: 1974     Passive exposure: Current    Smokeless tobacco: Never   Substance and Sexual Activity    Alcohol use: Yes     Comment: vodka or crown sometimes.    Drug use: Yes     Frequency: 7.0 times per week     Types: Marijuana     Comment: tries not to medical card    Sexual activity: Not Currently     Partners: Female     Social Drivers of Health     Financial Resource Strain: Medium Risk (10/31/2024)    Overall Financial Resource Strain (CARDIA)     Difficulty of Paying Living Expenses: Somewhat hard   Food Insecurity: No Food Insecurity (10/31/2024)    Hunger Vital Sign     Worried About Running Out of Food in the Last Year: Never true     Ran Out of Food in the Last Year: Never true   Transportation Needs: No Transportation Needs (10/31/2024)    PRAPARE - Transportation     Lack of Transportation (Medical): No     Lack of Transportation (Non-Medical): No   Physical Activity: Sufficiently Active (10/31/2024)    Exercise Vital Sign     Days of Exercise per Week: 7 days     Minutes of Exercise per Session: 30 min   Stress: No Stress Concern Present (10/31/2024)    [employer] - Occupational Stress Questionnaire     Feeling of Stress : Not at all   Housing Stability: Unknown (10/31/2024)    Housing Stability Vital Sign     Unable to Pay for Housing in the  "Last Year: No     Homeless in the Last Year: No       Social History     Social History Narrative    Not on file       Review of patient's allergies indicates:  No Known Allergies     Medications: Medications reviewed to include over the counter medications.    Review of Systems: A focused ROS was completed and found to be negative except for that mentioned above.      OBJECTIVE   PHYSICAL EXAM:  Vitals:    03/04/25 1421   BP: 120/80   BP Location: Left arm   Patient Position: Sitting   Pulse: 60   Resp: 16   SpO2: (!) 92%   Weight: 94.8 kg (208 lb 15.9 oz)   Height: 5' 11" (1.803 m)       GENERAL: NAD  HEENT: normocephalic, non-icteric conjunctivae, moist oral mucosa  RESPIRATORY: diminished lung sounds at but otherwise clear to auscultation, no wheezing, rales or rhonchi  CARDIOVASCULAR: Regular rate and rhythm, no murmurs rubs or gallops.  MUSCULOSKELETAL: No clubbing or cyanosis; no pedal edema  NEUROLOGIC: AO ×3, no gross deficits    LABS:  Lab studies reviewed and notable for 12/2023 CBC wnl SEos 90, 04/2023 with normal WBC, serum eosinophils 290 (peak 680), normal LFTs, serum creatinine 0.91, CO2 33    IMAGING:  Imaging reviewed and notable for LDCT 10/2024 with centrilobular and paraseptal emphysema, upper lobe predominant, bilateral lower lobe proximal airway ectasia, prominent mediastinal fat pad, small non calcified lung nodules (rad size 2-4 mm); stable from prior      LUNG FUNCTION TESTING:      SPIROMETRY/PFT:  01/2024 Pre Post   FVC 2.64/-3.11 3.25/-2.21 +BD   FEV1 1.01/-4.44 1.28/-4.05 +BD   FEV1/FVC 38/-4.38 39/-4.31   TLC 6.19/-1.00    FRC  4.00/0.28    RV 2.80/1.08    DLCO 8.47/-5.82      10/2020 Pre Post   FVC 1.62/36 2.19/48 +BD   FEV1 0.74/23 0.98/30 +BD   FEV1/FVC 46 45     08/2020 Pre Post   FVC 2.47/51% 1.72/35%   FEV1 1.18/34% 0.89/25%   FEV1/FVC 71 52   There is a paradoxical decrease in spirometric volumes with bronchodilator administration.  Flow volume loop with scooping out of " expiratory limb.  Technician comments include coughing throughout test.      6MWD:  Date Distance (ft) Resting SpO2; Darion SpO2 O2 Required   01/2024 1491 95%, RA; 90% none             ASSESSMENT & PLAN     1. Asthma-COPD overlap syndrome  Assessment & Plan:  60 yo M with ongoing nicotine dependence, asthma/COPD overlap presents for follow up evaluation having completed lung function testing. Emphysematous COPD GOLD 3B. Currently on Breztri 2 puff BID with albuterol rescue inhaler  - at this time, attribute his dyspneic episodes with exertion secondary to worsened asthma control; given utility of respiratory and asthma is an established at this time we will transitioned to a 2 week course of Trelegy with samples provided to allow this  -- instructed to hold Breztri during Trelegy use x2 weeks  -- inhaler teaching provided during visit with Ellipta device  - 6MWT completed with no rest or exertional hypoxia  - patient with established JANE diagnosis   -- will obtain overnight oximetry for assessment on APAP therapy  - pulmonary rehabilitation participation limited by caregiver role for his daughter       Orders:  -     budesonide-glycopyr-formoterol (BREZTRI AEROSPHERE) 160-9-4.8 mcg/actuation HFAA; Inhale 2 puffs into the lungs 2 (two) times daily.  Dispense: 10.7 g; Refill: 5    2. JANE (obstructive sleep apnea)  -     Ambulatory referral/consult to Pulmonology  -     PULSE OXIMETRY OVERNIGHT; Future    3. Anxiety, generalized  Assessment & Plan:  Generalized anxiety episodes triggered by dyspneic episodes which are unpredictable based on his chronic pulmonary disease.  We will try BuSpar twice daily for management of his symptoms.  Not currently taking Lexapro.    Orders:  -     busPIRone (BUSPAR) 5 MG Tab; Take 1 tablet (5 mg total) by mouth 2 (two) times daily.  Dispense: 60 tablet; Refill: 11    4. Chronic obstructive pulmonary disease, unspecified COPD type  -     Ambulatory referral/consult to Pulmonology  -      PULSE OXIMETRY OVERNIGHT; Future  -     budesonide-glycopyr-formoterol (BREZTRI AEROSPHERE) 160-9-4.8 mcg/actuation HFAA; Inhale 2 puffs into the lungs 2 (two) times daily.  Dispense: 10.7 g; Refill: 5          Follow up in about 8 months (around 11/4/2025) for Routine follow up.      Case was discussed with patient; all questions were answered to patient's satisfaction and patient verbalized understanding.     Sabine Cordova MD  Pulmonary Medicine  [facility]  Phone: [phone]

## 2025-03-04 NOTE — ASSESSMENT & PLAN NOTE
Generalized anxiety episodes triggered by dyspneic episodes which are unpredictable based on his chronic pulmonary disease.  We will try BuSpar twice daily for management of his symptoms.  Not currently taking Lexapro.

## 2025-03-04 NOTE — ASSESSMENT & PLAN NOTE
58 yo M with ongoing nicotine dependence, asthma/COPD overlap presents for follow up evaluation having completed lung function testing. Emphysematous COPD GOLD 3B. Currently on Breztri 2 puff BID with albuterol rescue inhaler  - at this time, attribute his dyspneic episodes with exertion secondary to worsened asthma control; given utility of respiratory and asthma is an established at this time we will transitioned to a 2 week course of Trelegy with samples provided to allow this  -- instructed to hold Breztri during Trelegy use x2 weeks  -- inhaler teaching provided during visit with Ellipta device  - 6MWT completed with no rest or exertional hypoxia  - patient with established JANE diagnosis   -- will obtain overnight oximetry for assessment on APAP therapy  - pulmonary rehabilitation participation limited by caregiver role for his daughter

## 2025-03-05 ENCOUNTER — TELEPHONE (OUTPATIENT)
Dept: FAMILY MEDICINE | Facility: CLINIC | Age: 60
End: 2025-03-05
Payer: MEDICARE

## 2025-03-05 NOTE — TELEPHONE ENCOUNTER
"Received call to the clinic from Alba Young NP, [email or web address], stating she was with the patient and going through his medications. She saw where the patient is taking both gabapentin 300mg 2 capsules twice daily prescribed by Dr Gee and pregabalin 75mg 1 capsule 3 times a day prescribed by Dr Deluna. She stated she didn't think the patient should be taking both of these medicines together since they were in the same family, I stated yes ma'am that is correct. She then asked, "well which one should he stop taking?"  Upon reviewing the patient's chart, I saw where Dr Gee had last prescribed the gabapentin to the patient last December (12/05/24) and that Dr Deluna prescribed the pregabalin this past January (01/29/25), I asked her if the patient had informed Dr Deluna if he was already taking the gabapentin? She stated she would assume no since she was sure Dr Deluna wouldn't prescribe the pregablin if he knew about the gabapentin. I stated I would think he would need to stop the gabapentin since he is going to pain treatment and the pregabalin was prescribed by them. She then stated she would inform the patient to d/c the gabapentin. I then asked her to make sure the patient takes all of his medications to the next visit with Dr Deluna. She verbalized understanding and stated she would inform the patient of this.  "

## 2025-03-11 ENCOUNTER — TELEPHONE (OUTPATIENT)
Dept: FAMILY MEDICINE | Facility: CLINIC | Age: 60
End: 2025-03-11
Payer: MEDICARE

## 2025-03-11 DIAGNOSIS — I73.9 PVD (PERIPHERAL VASCULAR DISEASE): Primary | ICD-10-CM

## 2025-03-11 NOTE — TELEPHONE ENCOUNTER
----- Message from Herminio Gee MD sent at 3/10/2025  7:49 AM CDT -----  Setup Art dopplers with JONY, dx: PVD  ----- Message -----  From: Tiffanie Ramírez LPN  Sent: 3/7/2025   3:43 PM CDT  To: Herminio Gee MD    It does not look like patient sees cardio in his chart or media.  ----- Message -----  From: Olivia Montiel MA  Sent: 3/6/2025   4:59 PM CST  To: [facility] Clinical Support S#    Albarochelle Young with [facility] called to let Dr Gee know that she did a PVD test on patient's legs and it showed that he had some circulatory problems.  She is needing us to follow up with patient concerning this.

## 2025-03-19 ENCOUNTER — HOSPITAL ENCOUNTER (OUTPATIENT)
Dept: RADIOLOGY | Facility: HOSPITAL | Age: 60
Discharge: HOME | End: 2025-03-19
Attending: FAMILY MEDICINE
Payer: MEDICARE

## 2025-03-19 DIAGNOSIS — I73.9 PVD (PERIPHERAL VASCULAR DISEASE): ICD-10-CM

## 2025-03-19 PROCEDURE — 93925 LOWER EXTREMITY STUDY: CPT | Mod: TC

## 2025-03-24 ENCOUNTER — RESULTS FOLLOW-UP (OUTPATIENT)
Dept: FAMILY MEDICINE | Facility: CLINIC | Age: 60
End: 2025-03-24

## 2025-03-28 ENCOUNTER — TELEPHONE (OUTPATIENT)
Dept: FAMILY MEDICINE | Facility: CLINIC | Age: 60
End: 2025-03-28
Payer: MEDICARE

## 2025-03-28 NOTE — TELEPHONE ENCOUNTER
----- Message from Herminio Gee MD sent at 3/24/2025  8:13 AM CDT -----  Some mild plaque but no significant blockages  ----- Message -----  From: Gladys, Rad Results In  Sent: 3/23/2025  11:11 AM CDT  To: Herminio Gee MD

## 2025-04-03 ENCOUNTER — OFFICE VISIT (OUTPATIENT)
Dept: FAMILY MEDICINE | Facility: CLINIC | Age: 60
End: 2025-04-03
Payer: MEDICARE

## 2025-04-03 VITALS
BODY MASS INDEX: 29.15 KG/M2 | TEMPERATURE: 98 F | DIASTOLIC BLOOD PRESSURE: 88 MMHG | RESPIRATION RATE: 18 BRPM | HEIGHT: 71 IN | SYSTOLIC BLOOD PRESSURE: 130 MMHG | HEART RATE: 58 BPM | OXYGEN SATURATION: 94 %

## 2025-04-03 DIAGNOSIS — F17.210 CIGARETTE NICOTINE DEPENDENCE WITHOUT COMPLICATION: ICD-10-CM

## 2025-04-03 DIAGNOSIS — E78.2 MIXED HYPERLIPIDEMIA: ICD-10-CM

## 2025-04-03 DIAGNOSIS — Z53.20 COLONOSCOPY REFUSED: Primary | ICD-10-CM

## 2025-04-03 DIAGNOSIS — R53.83 FATIGUE, UNSPECIFIED TYPE: ICD-10-CM

## 2025-04-03 DIAGNOSIS — J44.9 CHRONIC OBSTRUCTIVE PULMONARY DISEASE, UNSPECIFIED COPD TYPE: ICD-10-CM

## 2025-04-03 DIAGNOSIS — M54.50 CHRONIC BILATERAL LOW BACK PAIN WITHOUT SCIATICA: ICD-10-CM

## 2025-04-03 DIAGNOSIS — G89.29 CHRONIC BILATERAL LOW BACK PAIN WITHOUT SCIATICA: ICD-10-CM

## 2025-04-03 LAB
ALBUMIN SERPL BCP-MCNC: 3.7 G/DL (ref 3.4–4.8)
ALBUMIN/GLOB SERPL: 1.2 {RATIO}
ALP SERPL-CCNC: 89 U/L (ref 40–150)
ALT SERPL W P-5'-P-CCNC: 15 U/L
ANION GAP SERPL CALCULATED.3IONS-SCNC: 11 MMOL/L (ref 7–16)
AST SERPL W P-5'-P-CCNC: 16 U/L (ref 11–45)
BASOPHILS # BLD AUTO: 0.08 K/UL (ref 0–0.2)
BASOPHILS NFR BLD AUTO: 0.9 % (ref 0–1)
BILIRUB SERPL-MCNC: 0.4 MG/DL
BUN SERPL-MCNC: 9 MG/DL (ref 8–26)
BUN/CREAT SERPL: 11 (ref 6–20)
CALCIUM SERPL-MCNC: 9.1 MG/DL (ref 8.8–10)
CHLORIDE SERPL-SCNC: 103 MMOL/L (ref 98–107)
CHOLEST SERPL-MCNC: 159 MG/DL
CHOLEST/HDLC SERPL: 2.9 {RATIO}
CO2 SERPL-SCNC: 32 MMOL/L (ref 23–31)
CREAT SERPL-MCNC: 0.82 MG/DL (ref 0.72–1.25)
DIFFERENTIAL METHOD BLD: ABNORMAL
EGFR (NO RACE VARIABLE) (RUSH/TITUS): 100 ML/MIN/1.73M2
EOSINOPHIL # BLD AUTO: 0.37 K/UL (ref 0–0.5)
EOSINOPHIL NFR BLD AUTO: 4.1 % (ref 1–4)
ERYTHROCYTE [DISTWIDTH] IN BLOOD BY AUTOMATED COUNT: 13.9 % (ref 11.5–14.5)
GLOBULIN SER-MCNC: 3.1 G/DL (ref 2–4)
GLUCOSE SERPL-MCNC: 80 MG/DL (ref 82–115)
HCT VFR BLD AUTO: 49.6 % (ref 40–54)
HDLC SERPL-MCNC: 55 MG/DL (ref 35–60)
HGB BLD-MCNC: 14.9 G/DL (ref 13.5–18)
IMM GRANULOCYTES # BLD AUTO: 0.05 K/UL (ref 0–0.04)
IMM GRANULOCYTES NFR BLD: 0.5 % (ref 0–0.4)
LDLC SERPL CALC-MCNC: 92 MG/DL
LDLC/HDLC SERPL: 1.7 {RATIO}
LYMPHOCYTES # BLD AUTO: 2.8 K/UL (ref 1–4.8)
LYMPHOCYTES NFR BLD AUTO: 30.8 % (ref 27–41)
MCH RBC QN AUTO: 28.4 PG (ref 27–31)
MCHC RBC AUTO-ENTMCNC: 30 G/DL (ref 32–36)
MCV RBC AUTO: 94.7 FL (ref 80–96)
MONOCYTES # BLD AUTO: 0.59 K/UL (ref 0–0.8)
MONOCYTES NFR BLD AUTO: 6.5 % (ref 2–6)
MPC BLD CALC-MCNC: 10.9 FL (ref 9.4–12.4)
NEUTROPHILS # BLD AUTO: 5.21 K/UL (ref 1.8–7.7)
NEUTROPHILS NFR BLD AUTO: 57.2 % (ref 53–65)
NONHDLC SERPL-MCNC: 104 MG/DL
NRBC # BLD AUTO: 0 X10E3/UL
NRBC, AUTO (.00): 0 %
PLATELET # BLD AUTO: 260 K/UL (ref 150–400)
POTASSIUM SERPL-SCNC: 4.7 MMOL/L (ref 3.5–5.1)
PROT SERPL-MCNC: 6.8 G/DL (ref 5.8–7.6)
RBC # BLD AUTO: 5.24 M/UL (ref 4.6–6.2)
SODIUM SERPL-SCNC: 141 MMOL/L (ref 136–145)
TRIGL SERPL-MCNC: 60 MG/DL (ref 34–140)
VLDLC SERPL-MCNC: 12 MG/DL
WBC # BLD AUTO: 9.1 K/UL (ref 4.5–11)

## 2025-04-03 PROCEDURE — 1160F RVW MEDS BY RX/DR IN RCRD: CPT | Mod: ,,, | Performed by: FAMILY MEDICINE

## 2025-04-03 PROCEDURE — 3075F SYST BP GE 130 - 139MM HG: CPT | Mod: ,,, | Performed by: FAMILY MEDICINE

## 2025-04-03 PROCEDURE — 99214 OFFICE O/P EST MOD 30 MIN: CPT | Mod: ,,, | Performed by: FAMILY MEDICINE

## 2025-04-03 PROCEDURE — 80053 COMPREHEN METABOLIC PANEL: CPT | Mod: ,,, | Performed by: CLINICAL MEDICAL LABORATORY

## 2025-04-03 PROCEDURE — 80061 LIPID PANEL: CPT | Mod: ,,, | Performed by: CLINICAL MEDICAL LABORATORY

## 2025-04-03 PROCEDURE — 85025 COMPLETE CBC W/AUTO DIFF WBC: CPT | Mod: ,,, | Performed by: CLINICAL MEDICAL LABORATORY

## 2025-04-03 PROCEDURE — 3079F DIAST BP 80-89 MM HG: CPT | Mod: ,,, | Performed by: FAMILY MEDICINE

## 2025-04-03 PROCEDURE — 1159F MED LIST DOCD IN RCRD: CPT | Mod: ,,, | Performed by: FAMILY MEDICINE

## 2025-04-03 PROCEDURE — 3008F BODY MASS INDEX DOCD: CPT | Mod: ,,, | Performed by: FAMILY MEDICINE

## 2025-04-03 RX ORDER — BUPROPION HYDROCHLORIDE 150 MG/1
150 TABLET, EXTENDED RELEASE ORAL 2 TIMES DAILY
Qty: 60 TABLET | Refills: 11 | Status: SHIPPED | OUTPATIENT
Start: 2025-04-03 | End: 2026-04-03

## 2025-04-03 NOTE — PROGRESS NOTES
Andrez Wen is a 59 y.o. male seen today for follow-up on his hyperlipidemia history of COPD and smoking.  He reports he is down to 2 cigarettes daily only when he is driving and we discussed some measures to hopefully eliminate those 2.  He is fasting today for follow-up blood work but is complaining of some fatigue although with his currently interrupted sleep pattern due to his significant other having seizures this is likely related to sleep deprivation.  Still we discussed a thyroid panel    Past Medical History:   Diagnosis Date    Chronic heart failure     COPD (chronic obstructive pulmonary disease)      Family History   Family history unknown: Yes     Medications Ordered Prior to Encounter[1]  Immunization History   Administered Date(s) Administered    COVID-19 Vaccine 02/26/2021    COVID-19, MRNA, LN-S, PF (MODERNA FULL 0.5 ML DOSE) 02/26/2021, 03/26/2021    Influenza - Trivalent - Flucelvax - PF 10/03/2024    Pneumococcal Conjugate - 20 Valent 10/03/2024       Review of Systems   Constitutional:  Positive for malaise/fatigue. Negative for fever and weight loss.   Respiratory:  Negative for shortness of breath.    Cardiovascular:  Negative for chest pain and palpitations.   Gastrointestinal:  Negative for nausea and vomiting.   Psychiatric/Behavioral:  Negative for depression.         Vitals:    04/03/25 1033   BP: 130/88   Pulse: (!) 58   Resp: 18   Temp: 97.9 °F (36.6 °C)       Physical Exam  Vitals reviewed.   Constitutional:       Appearance: Normal appearance.   HENT:      Head: Normocephalic.   Eyes:      Extraocular Movements: Extraocular movements intact.      Conjunctiva/sclera: Conjunctivae normal.      Pupils: Pupils are equal, round, and reactive to light.   Neck:      Thyroid: No thyroid mass or thyromegaly.   Cardiovascular:      Rate and Rhythm: Normal rate and regular rhythm.      Heart sounds: Normal heart sounds. No murmur heard.     No gallop.   Pulmonary:      Effort: Pulmonary  effort is normal. No respiratory distress.      Breath sounds: Decreased breath sounds present. No wheezing or rales.   Skin:     General: Skin is warm and dry.      Coloration: Skin is not jaundiced or pale.   Neurological:      Mental Status: He is alert.   Psychiatric:         Mood and Affect: Mood normal.         Behavior: Behavior normal.         Thought Content: Thought content normal.         Judgment: Judgment normal.          Assessment and Plan  1. Colonoscopy refused    2. Cigarette nicotine dependence without complication  -     buPROPion (WELLBUTRIN SR) 150 MG TBSR 12 hr tablet; Take 1 tablet (150 mg total) by mouth 2 (two) times daily.  Dispense: 60 tablet; Refill: 11    3. Mixed hyperlipidemia  -     buPROPion (WELLBUTRIN SR) 150 MG TBSR 12 hr tablet; Take 1 tablet (150 mg total) by mouth 2 (two) times daily.  Dispense: 60 tablet; Refill: 11  -     CBC Auto Differential; Future; Expected date: 04/10/2025  -     Comprehensive Metabolic Panel; Future; Expected date: 04/10/2025  -     Lipid Panel; Future; Expected date: 04/10/2025    4. Fatigue, unspecified type  -     TSH; Future; Expected date: 04/03/2025  -     T4, Free; Future; Expected date: 04/03/2025             Return to clinic in 6 months or as needed once his lab work is in.    Health Maintenance Topics with due status: Not Due       Topic Last Completion Date    Hemoglobin A1c (Diabetic Prevention Screening) 08/22/2022    Lipid Panel 07/16/2024    LDCT Lung Screen 10/22/2024              [1]   Current Outpatient Medications on File Prior to Visit   Medication Sig Dispense Refill    albuterol (PROVENTIL) 2.5 mg /3 mL (0.083 %) nebulizer solution Take 3 mLs (2.5 mg total) by nebulization 2 (two) times a day. 90 mL 2    budesonide-glycopyr-formoterol (BREZTRI AEROSPHERE) 160-9-4.8 mcg/actuation HFAA Inhale 2 puffs into the lungs 2 (two) times daily. 10.7 g 5    busPIRone (BUSPAR) 5 MG Tab Take 1 tablet (5 mg total) by mouth 2 (two) times daily.  60 tablet 11    HYDROcodone-acetaminophen (NORCO) 7.5-325 mg per tablet Take 1 tablet by mouth 2 (two) times daily as needed.      pantoprazole (PROTONIX) 40 MG tablet TAKE 1 TABLET BY MOUTH ONCE DAILY 30 tablet 10    pregabalin (LYRICA) 75 MG capsule Take 75 mg by mouth 3 (three) times daily.      rosuvastatin (CRESTOR) 20 MG tablet TAKE 1 TABLET BY MOUTH EACH EVENING 30 tablet 10    silver sulfADIAZINE 1% (SILVADENE) 1 % cream APPLY EXTERNALLY TWICE DAILY TO LEGS 400 g 10    tiZANidine (ZANAFLEX) 4 MG tablet TAKE 1 TABLET BY MOUTH EVERY 8 HOURS 270 tablet 1    VENTOLIN HFA 90 mcg/actuation inhaler INHALE TWO (2) PUFFS BY MOUTH EVERY 4 HOURS 18 g 5    [DISCONTINUED] buPROPion (WELLBUTRIN SR) 150 MG TBSR 12 hr tablet Take 1 tablet (150 mg total) by mouth 2 (two) times daily. 60 tablet 11    EScitalopram oxalate (LEXAPRO) 5 MG Tab Take 1 tablet by mouth once daily (Patient not taking: Reported on 10/31/2024) 30 tablet 5    gabapentin (NEURONTIN) 300 MG capsule TAKE 2 CAPSULES BY MOUTH TWICE DAILY (Patient not taking: Reported on 4/3/2025) 120 capsule 10     No current facility-administered medications on file prior to visit.

## 2025-04-04 RX ORDER — BUPROPION HYDROCHLORIDE 150 MG/1
150 TABLET, EXTENDED RELEASE ORAL 2 TIMES DAILY
Qty: 60 TABLET | Refills: 10 | OUTPATIENT
Start: 2025-04-04

## 2025-04-04 RX ORDER — ALBUTEROL SULFATE 90 UG/1
2 AEROSOL, METERED RESPIRATORY (INHALATION) EVERY 4 HOURS PRN
Qty: 18 G | Refills: 10 | OUTPATIENT
Start: 2025-04-04

## 2025-04-04 RX ORDER — TIZANIDINE 4 MG/1
4 TABLET ORAL EVERY 8 HOURS
Qty: 90 TABLET | Refills: 10 | OUTPATIENT
Start: 2025-04-04

## 2025-04-04 RX ORDER — TIZANIDINE 4 MG/1
4 TABLET ORAL EVERY 8 HOURS
Qty: 270 TABLET | Refills: 1 | Status: SHIPPED | OUTPATIENT
Start: 2025-04-04

## 2025-04-04 RX ORDER — ALBUTEROL SULFATE 90 UG/1
AEROSOL, METERED RESPIRATORY (INHALATION)
Qty: 18 G | Refills: 5 | Status: SHIPPED | OUTPATIENT
Start: 2025-04-04

## 2025-04-09 ENCOUNTER — ANESTHESIA (OUTPATIENT)
Dept: PAIN MEDICINE | Facility: HOSPITAL | Age: 60
End: 2025-04-09
Payer: MEDICARE

## 2025-04-09 ENCOUNTER — RESULTS FOLLOW-UP (OUTPATIENT)
Dept: FAMILY MEDICINE | Facility: CLINIC | Age: 60
End: 2025-04-09

## 2025-04-09 ENCOUNTER — HOSPITAL ENCOUNTER (OUTPATIENT)
Facility: HOSPITAL | Age: 60
Discharge: HOME | End: 2025-04-09
Attending: ANESTHESIOLOGY | Admitting: ANESTHESIOLOGY
Payer: MEDICARE

## 2025-04-09 ENCOUNTER — ANESTHESIA EVENT (OUTPATIENT)
Dept: PAIN MEDICINE | Facility: HOSPITAL | Age: 60
End: 2025-04-09
Payer: MEDICARE

## 2025-04-09 VITALS
BODY MASS INDEX: 30.8 KG/M2 | OXYGEN SATURATION: 99 % | DIASTOLIC BLOOD PRESSURE: 74 MMHG | HEART RATE: 55 BPM | HEIGHT: 71 IN | SYSTOLIC BLOOD PRESSURE: 136 MMHG | WEIGHT: 220 LBS | TEMPERATURE: 98 F | RESPIRATION RATE: 12 BRPM

## 2025-04-09 DIAGNOSIS — M47.814 THORACIC SPONDYLOSIS WITHOUT MYELOPATHY: ICD-10-CM

## 2025-04-09 PROCEDURE — 27000284 HC CANNULA NASAL: Performed by: NURSE ANESTHETIST, CERTIFIED REGISTERED

## 2025-04-09 PROCEDURE — 63600175 PHARM REV CODE 636 W HCPCS: Performed by: NURSE ANESTHETIST, CERTIFIED REGISTERED

## 2025-04-09 PROCEDURE — 64491 INJ PARAVERT F JNT C/T 2 LEV: CPT | Mod: 50 | Performed by: ANESTHESIOLOGY

## 2025-04-09 PROCEDURE — 37000008 HC ANESTHESIA 1ST 15 MINUTES: Performed by: ANESTHESIOLOGY

## 2025-04-09 PROCEDURE — 64492 INJ PARAVERT F JNT C/T 3 LEV: CPT | Mod: 50 | Performed by: ANESTHESIOLOGY

## 2025-04-09 PROCEDURE — 64490 INJ PARAVERT F JNT C/T 1 LEV: CPT | Mod: 50 | Performed by: ANESTHESIOLOGY

## 2025-04-09 PROCEDURE — 63600175 PHARM REV CODE 636 W HCPCS: Performed by: ANESTHESIOLOGY

## 2025-04-09 RX ORDER — BUPIVACAINE HYDROCHLORIDE 2.5 MG/ML
INJECTION, SOLUTION INFILTRATION; PERINEURAL CODE/TRAUMA/SEDATION MEDICATION
Status: DISCONTINUED | OUTPATIENT
Start: 2025-04-09 | End: 2025-04-09 | Stop reason: HOSPADM

## 2025-04-09 RX ORDER — LIDOCAINE HYDROCHLORIDE 20 MG/ML
INJECTION, SOLUTION EPIDURAL; INFILTRATION; INTRACAUDAL; PERINEURAL
Status: DISCONTINUED | OUTPATIENT
Start: 2025-04-09 | End: 2025-04-09

## 2025-04-09 RX ORDER — PROPOFOL 10 MG/ML
VIAL (ML) INTRAVENOUS
Status: DISCONTINUED | OUTPATIENT
Start: 2025-04-09 | End: 2025-04-09

## 2025-04-09 RX ORDER — TRIAMCINOLONE ACETONIDE 40 MG/ML
INJECTION, SUSPENSION INTRA-ARTICULAR; INTRAMUSCULAR CODE/TRAUMA/SEDATION MEDICATION
Status: DISCONTINUED | OUTPATIENT
Start: 2025-04-09 | End: 2025-04-09 | Stop reason: HOSPADM

## 2025-04-09 RX ORDER — SODIUM CHLORIDE 9 MG/ML
500 INJECTION, SOLUTION INTRAVENOUS CONTINUOUS
Status: DISCONTINUED | OUTPATIENT
Start: 2025-04-09 | End: 2025-04-09 | Stop reason: HOSPADM

## 2025-04-09 RX ADMIN — PROPOFOL 50 MG: 10 INJECTION, EMULSION INTRAVENOUS at 11:04

## 2025-04-09 RX ADMIN — PROPOFOL 75 MG: 10 INJECTION, EMULSION INTRAVENOUS at 11:04

## 2025-04-09 RX ADMIN — LIDOCAINE HYDROCHLORIDE 50 MG: 20 INJECTION, SOLUTION EPIDURAL; INFILTRATION; INTRACAUDAL; PERINEURAL at 11:04

## 2025-04-09 NOTE — OP NOTE
04/09/2025  Andrez Norphlet 1965      PREOPERATIVE DIAGNOSIS:     Thoracic Spondylosis without myelopathy                                                         Thoracic back pain     POSTOPERATIVE DIAGNOSIS:  Thoracic  Spondylosis without myelopathy                                                         Thoracic back pain     PROCEDURE:  Bilateral Thoracic  8-11 Facet Injection under Fluoroscopic Guidance     SURGEON:Dr. Beka Deluna  ANESTHESIA:  MAC                        COMPLICATIONS:  None  DRAINS AND PACKS:  None          BLOOD LOSS:  None     Patient was identified in the holding area.  The risk and benefits of the procedure were again explained to the patient and the patient agreed to proceed.  Consent form was signed and obtained.  Neck was marked with a skin pen.  The patient was taken in stable condition to the operating room and was placed prone on the C-arm table.  All pressure points were checked and padded comfortably while the patient was awake.  The neck was then prepped and draped in the usual sterile fashion. Standard ASA monitors were applied.  Anesthesia was initiated.  The C-arm was brought into the true AP position, time-out was completed and standard ASA monitors were applied throughout the procedure.  Under direct fluoroscopic guidance a 22-gauge 3-1/2-inch needle was advanced to anesthetize the skin, which was accomplished by using 2mL of 0.25% (2.5mg/ml)  Bupivacaine, anesthetizing down to deeper paraspinous muscles, structures in the neck.  Under direct fluoroscopic guidance the needle was advanced to the  T8, T9, T10 and T11 facets on left side.  The patient tolerated the procedure well with no adverse events.  There were no paresthesias with needle placement or injection.  At each level a 1.5-mL allotment of a solution that contained Kenalog 40mg/ml 1/2 ml diluted into 8mL of 0.25%(2.5mg/ml) Bupivacaine was used without complication.  The procedure was repeated on the right as  described above. The needle was removed with the tip intact.  Patient tolerated the procedure well, no adverse event, and was taken in stable condition to the holding area and was monitored for the appropriate time of convalescence.  The patient was discharged home in the care of the patients  with no complications.      The preoperative pain score was  8/10.   The postoperative pain score  /10. Pipa %

## 2025-04-09 NOTE — PLAN OF CARE
Plan:  D/c pt via wheelchair at 1155  Informed pt if does not void in 8 hours to go to ER. Notify if redness, drainage, from injection site or fever over next 3-4 days. Rest and drink plenty of fluids for the remainder of the day. No lifting over 5 lbs. For the remainder of the day. Continue regular medications as prescribed. May take pain medications as prescribed.     Pain improved 37.5%  Pre-procedure pain: 8  Post-procedure pain: 5

## 2025-04-09 NOTE — ANESTHESIA PREPROCEDURE EVALUATION
04/09/2025  Andrez Wen is a 59 y.o., male.      Pre-op Assessment    I have reviewed the Patient Summary Reports.     I have reviewed the Nursing Notes. I have reviewed the NPO Status.   I have reviewed the Medications.     Review of Systems  Anesthesia Hx:  No problems with previous Anesthesia                Cardiovascular:            CHF                Shortness of Breath       Congestive Heart Failure (CHF)                      Pulmonary:   COPD Asthma  Shortness of breath  Sleep Apnea   Asthma:   Chronic Obstructive Pulmonary Disease (COPD):           Obstructive Sleep Apnea (JANE).           Hepatic/GI:     GERD         Gerd          Psych:  Psychiatric History              Problem List[1]     Physical Exam  General: Well nourished, Cooperative, Alert and Oriented    Airway:  Mallampati: II   Mouth Opening: Normal  TM Distance: Normal  Tongue: Normal  Neck ROM: Normal ROM    Dental:  Intact      Diagnosis    Asthma-COPD overlap syndrome    Hyperlipidemia    Gastroesophageal reflux disease    Anxiety    Fatigue    Irregular heart beat    Congestive heart failure    Exertional dyspnea    Intermittent palpitations    Morbid obesity    Tobacco abuse disorder    JANE (obstructive sleep apnea)    Traumatic complete tear of right rotator cuff    Smoking greater than 40 pack years    Nicotine dependence with current use    Chronic cough    Colon cancer screening    Anxiety, generalized       Anesthesia Plan  Type of Anesthesia, risks & benefits discussed:    Anesthesia Type: Gen Natural Airway, MAC  Intra-op Monitoring Plan: Standard ASA Monitors  Post Op Pain Control Plan: multimodal analgesia  Induction:  IV  Informed Consent: Informed consent signed with the Patient and all parties understand the risks and agree with anesthesia plan.  All questions answered. Patient consented to blood products? Yes  ASA  Score: 3  Day of Surgery Review of History & Physical: I have interviewed and examined the patient. I have reviewed the patient's H&P dated: There are no significant changes.     Ready For Surgery From Anesthesia Perspective.     .           [1]   Patient Active Problem List  Diagnosis    Asthma-COPD overlap syndrome    Hyperlipidemia    Gastroesophageal reflux disease    Anxiety    Fatigue    Irregular heart beat    Congestive heart failure    Exertional dyspnea    Intermittent palpitations    Morbid obesity    Tobacco abuse disorder    JANE (obstructive sleep apnea)    Traumatic complete tear of right rotator cuff    Smoking greater than 40 pack years    Nicotine dependence with current use    Chronic cough    Colon cancer screening    Anxiety, generalized

## 2025-04-09 NOTE — H&P
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 be monitored every 1-3 months via office visits,  evaluations, and urinary drug screens.     2. Follow up in 2 weeks after procedure.  Patient may contact office for earlier follow-up should an issue arise.     3. Patient will be scheduled for facet joint injections of the thoracic spine. We have discussed the need for two diagnostically sound procedures before the radiofrequency ablation of the medial branch nerves can be scheduled.     Indications for this procedure for this specific patient include the following:   - Pt has had symptoms for three months with moderate to severe pain with functional impairment rated of  10/10 pain.   - Pain non-responsive to conservative care.Yes   - No non-facet pathology as source of pain.None   - Clinical assessment implicaties facet joint as putative pain source. Yes  - No unexplained neurologic deficit. None  - No history of coagulopathy , infection or unstable medical conditions.None  - Pain is causing significant functional limitation resulting in diminished quaiity of life and impaired age appropriate ADL's.Yes  - Repeat injections not done prior to 7 days.No        NSAIDS Failure__X___  Pain for 3 months or >___X__  Pain level 6> intermittent or  continuous_X___  Physical exam with documented signs that facets are the primary source of pain_X___     Thoracic Facet injection 7-10 Dx:(M47.814) - Thoracic spondylosis without myelopathy  No Sedation      4. Procedure instructions given to pt who verbalized understanding. Procedure sheet given to the patient after verbally voicing understanding of the sheet concerning blood thinners, NPO status, and importance of a .     5. Monitored Anesthesia Care medical necessity authorization request:       Monitor anesthesia request is medically indicated for the scheduled ___Thoracic FI _____procedure due to:     - needle phobia and anxiety, placing the patient at risk during the provided service._____  - patient has a BMI greater than 45 ____  - patient has severe sleep apnea for which BiPAP and oxygen are needed while sleeping._____  - patient is unable to follow simple commands due to mental state.____  - patient has an ASA class greater than 3 and requires constant presence of an anesthesiologist/CRNA during the procedure.____  - patient has severe problems with muscles and muscle spasticity that makes it hard to lie still. ____  - patient suffers from chronic pain and is unable to function due to diminished ADL's.____  - patient is dependent on opioids or sedatives.____  - Other _x___     NARCOTIC STATEMENT  Patient is taking the narcotic pain medications as prescribed. Refill is being given because of the benefit to the patient in regards to the pain. Patient has agreed not to abuse medication and not to take it more than what is prescribed. The nature of the drug including the potential for addiction and dependency and abuse was also discussed with the patient. Patient has developed physical dependency for the narcotic pain medication for his pain relief.  Patient has also developed tolerance to the sedative effect of the narcotic pain medications.  Patient has decided to continue with these medications  despite potential for addiction as described by this office.  This was stressed to the patient that it is the patient's responsibility to secure the narcotic medication and in any event of loss for any reason whatsoever,  there will be no refill before the next due date. Patient also understands that they are not supposed to drive or work on machinery while taking these medications.  Also explained to the patient that in the event of traffic citation, the presence of this drug in  bloodstream may result in DUI.  Patient has been advised not to drink alcohol while taking this medication.  Patient has verbalized understanding of our office policy and has signed a contract with us in this regard.        Compliance Statement:  Documented by Cathy Kc RN acting as a scribe for Beka Deluna M.D. The note accurately reflects work and decisions performed by me.                Prescriptions Written Today:  HYDROcodone-Acetaminophen Oral Tablet 7.5-325 MG  Take 1 tablet twice a day as needed for 30 day(s)  Refills: No Refills  Rx quantity: 60  Take 1 tablet twice a day as needed for 30 day(s)  Refills: No Refills  Rx quantity: 60  Take 1 tablet twice a day as needed for 30 day(s)  Refills: No Refills  Rx quantity: 60,  Pregabalin Oral Capsule 75 MG  Take 1 capsule three times a day for 30 day(s)  Refills: 2  Rx quantity: 90                 Beka Deluna MD      Electronically signed: 4/1/2025 2:01:42 PM           Chief Complaint:      HPI:       Assessment/Plan:         Beka Deluna MD  Pain Management  [facility] - Pain Management

## 2025-04-09 NOTE — DISCHARGE SUMMARY
Patient underwent Bilateral Thoracic  8-11 Facet Injection under Fluoroscopic Guidance      procedure 04/09/2025. The pt will follow up in clinic. Discharged home. Discharge Dx:  Thoracic Spondylosis without myelopathy

## 2025-04-09 NOTE — TRANSFER OF CARE
"Anesthesia Transfer of Care Note    Patient: Andrez Wen    Procedure(s) Performed: Procedure(s) (LRB):  INJECTION, FACET JOINT (Bilateral)    Patient location: Other:    Anesthesia Type: MAC    Transport from OR: Transported from OR on room air with adequate spontaneous ventilation    Post pain: adequate analgesia    Post assessment: no apparent anesthetic complications    Post vital signs: stable    Level of consciousness: responds to stimulation    Nausea/Vomiting: no nausea/vomiting    Complications: none    Transfer of care protocol was followed      Last vitals: Visit Vitals  /70   Pulse (!) 57   Temp 36.8 °C (98.2 °F) (Oral)   Resp 13   Ht 5' 11" (1.803 m)   Wt 99.8 kg (220 lb)   SpO2 96%   BMI 30.68 kg/m²     "

## 2025-04-10 NOTE — ANESTHESIA POSTPROCEDURE EVALUATION
Anesthesia Post Evaluation    Patient: Andrez Wen    Procedure(s) Performed: Procedure(s) (LRB):  INJECTION, FACET JOINT (Bilateral)    Final Anesthesia Type: MAC      Patient participation: Yes- Able to Participate  Level of consciousness: awake and alert  Post-procedure vital signs: reviewed and stable  Pain management: adequate  Airway patency: patent    PONV status at discharge: No PONV  Anesthetic complications: no      Cardiovascular status: blood pressure returned to baseline, hemodynamically stable and stable  Respiratory status: unassisted  Hydration status: euvolemic  Follow-up not needed.  Comments: See nursing note for post op pain/bertin score.              Vitals Value Taken Time   /74 04/09/25 11:46   Temp 36.8 °C (98.2 °F) 04/09/25 11:18   Pulse 55 04/09/25 11:47   Resp 10 04/09/25 11:47   SpO2 100 % 04/09/25 11:46   Vitals shown include unfiled device data.      Event Time   Out of Recovery 11:46:00         Pain/Bertin Score: Bertin Score: 9 (4/9/2025 11:23 AM)

## 2025-04-11 ENCOUNTER — TELEPHONE (OUTPATIENT)
Dept: FAMILY MEDICINE | Facility: CLINIC | Age: 60
End: 2025-04-11
Payer: MEDICARE

## 2025-04-11 NOTE — TELEPHONE ENCOUNTER
----- Message from Herminio Gee MD sent at 4/9/2025  7:52 AM CDT -----  Good lipids but persistent CO2 elevation may be consistent with poorly controlled sleep apnea.  Please check on his thyroid studies  ----- Message -----  From: Lab, Background User  Sent: 4/3/2025   9:03 PM CDT  To: Herminio Gee MD

## 2025-04-23 ENCOUNTER — TELEPHONE (OUTPATIENT)
Dept: FAMILY MEDICINE | Facility: CLINIC | Age: 60
End: 2025-04-23
Payer: MEDICARE

## 2025-04-23 NOTE — TELEPHONE ENCOUNTER
Pt notified that lipids are good but has elevated CO2 level. He reports that he does see sleep med and they just adjusted his CPAP last month, he does report that he went a week or so prior to the lab collection with out his CPAP due to having to stay in the hospital with a relative. Voiced understanding and notified Dr Gee. Encouraged patient to continue persistent use of CPAP machine, he voiced understanding.

## 2025-04-23 NOTE — TELEPHONE ENCOUNTER
----- Message from Herminio Gee MD sent at 4/10/2025  8:00 AM CDT -----  Normal thyroid studies  ----- Message -----  From: Lab, Background User  Sent: 4/3/2025   9:03 PM CDT  To: Herminio Gee MD

## 2025-04-23 NOTE — TELEPHONE ENCOUNTER
----- Message from Herminio Gee MD sent at 4/9/2025  7:52 AM CDT -----  Good lipids but persistent CO2 elevation may be consistent with poorly controlled sleep apnea.  Please check on his thyroid studies  ----- Message -----  From: Lab, Background User  Sent: 4/3/2025   9:03 PM CDT  To: Herminio Gee MD     Earlier OV1 appt available 7/19 @ 8:30   Pt going to see if she can arrange to get off from work and call back

## 2025-05-06 ENCOUNTER — PATIENT OUTREACH (OUTPATIENT)
Facility: HOSPITAL | Age: 60
End: 2025-05-06
Payer: MEDICARE

## 2025-07-22 ENCOUNTER — OFFICE VISIT (OUTPATIENT)
Dept: FAMILY MEDICINE | Facility: CLINIC | Age: 60
End: 2025-07-22
Payer: MEDICARE

## 2025-07-22 VITALS
OXYGEN SATURATION: 94 % | TEMPERATURE: 98 F | DIASTOLIC BLOOD PRESSURE: 82 MMHG | HEIGHT: 71 IN | HEART RATE: 72 BPM | SYSTOLIC BLOOD PRESSURE: 136 MMHG | BODY MASS INDEX: 32.03 KG/M2 | WEIGHT: 228.81 LBS | RESPIRATION RATE: 16 BRPM

## 2025-07-22 DIAGNOSIS — L98.9 DISORDER OF SKIN: Primary | ICD-10-CM

## 2025-07-22 PROCEDURE — 3008F BODY MASS INDEX DOCD: CPT | Mod: ,,, | Performed by: NURSE PRACTITIONER

## 2025-07-22 PROCEDURE — 99212 OFFICE O/P EST SF 10 MIN: CPT | Mod: ,,, | Performed by: NURSE PRACTITIONER

## 2025-07-22 PROCEDURE — 3075F SYST BP GE 130 - 139MM HG: CPT | Mod: ,,, | Performed by: NURSE PRACTITIONER

## 2025-07-22 PROCEDURE — 1160F RVW MEDS BY RX/DR IN RCRD: CPT | Mod: ,,, | Performed by: NURSE PRACTITIONER

## 2025-07-22 PROCEDURE — 1159F MED LIST DOCD IN RCRD: CPT | Mod: ,,, | Performed by: NURSE PRACTITIONER

## 2025-07-22 PROCEDURE — 3079F DIAST BP 80-89 MM HG: CPT | Mod: ,,, | Performed by: NURSE PRACTITIONER

## 2025-07-22 RX ORDER — SULFAMETHOXAZOLE AND TRIMETHOPRIM 800; 160 MG/1; MG/1
1 TABLET ORAL 2 TIMES DAILY
Qty: 14 TABLET | Refills: 0 | Status: SHIPPED | OUTPATIENT
Start: 2025-07-22

## 2025-07-22 NOTE — PROGRESS NOTES
[facility] Medicine    Chief Complaint      Chief Complaint   Patient presents with    Rash     Pt reports itchy painful bumps all over head for over a week.  Pt also reports bumps being on his tongue  and it being severely painful.       History of Present Illness      Andrez Wen is a 59 y.o. male. He  has a past medical history of Chronic heart failure and COPD (chronic obstructive pulmonary disease)., who presents today for c/o rash to his scalp.  States they itch and are painful x1 week    Past Medical History:  Past Medical History:   Diagnosis Date    Chronic heart failure     COPD (chronic obstructive pulmonary disease)        Past Surgical History:   has a past surgical history that includes Injection of facet joint (Bilateral, 10/2/2024) and Injection of facet joint (Bilateral, 4/9/2025).    Social History:  Social History[1]    I personally reviewed all past medical, surgical, and social.     Review of Systems   Respiratory:  Negative for shortness of breath.    Cardiovascular: Negative.    Skin:  Positive for rash. Negative for color change.        Medications:  Encounter Medications[2]    Allergies:  Review of patient's allergies indicates:  No Known Allergies    Health Maintenance:  Immunization History   Administered Date(s) Administered    COVID-19 Vaccine 02/26/2021    COVID-19, MRNA, LN-S, PF (MODERNA FULL 0.5 ML DOSE) 02/26/2021, 03/26/2021    Influenza - Trivalent - Flucelvax - PF 10/03/2024    Pneumococcal Conjugate - 20 Valent 10/03/2024      Health Maintenance   Topic Date Due    HIV Screening  Never done    TETANUS VACCINE  Never done    Shingles Vaccine (1 of 2) Never done    RSV Vaccine (Age 60+ and Pregnant patients) (1 - Risk 60-74 years 1-dose series) Never done    COVID-19 Vaccine (4 - 2024-25 season) 09/01/2024    Colorectal Cancer Screening  02/21/2025    Hemoglobin A1c (Diabetic Prevention Screening)  08/22/2025    Influenza Vaccine (1) 09/01/2025    LDCT Lung Screen  10/22/2025  "   Lipid Panel  04/03/2030    Hepatitis C Screening  Completed    Pneumococcal Vaccines (Age 50+)  Completed        Physical Exam      Vital Signs  Temp: 98 °F (36.7 °C)  Temp Source: Oral  Pulse: 72  Resp: 16  SpO2: (!) 94 %  BP: 136/82  BP Location: Left arm  Patient Position: Sitting  Pain Score:   8  Pain Loc: Generalized  Height and Weight  Height: 5' 11" (180.3 cm)  Weight: 103.8 kg (228 lb 12.8 oz)  BSA (Calculated - sq m): 2.28 sq meters  BMI (Calculated): 31.9  Weight in (lb) to have BMI = 25: 178.9]    Physical Exam  Vitals and nursing note reviewed.   Constitutional:       Appearance: Normal appearance.   HENT:      Head: Normocephalic.      Right Ear: Hearing normal.      Left Ear: Hearing normal.      Nose: Nose normal.   Eyes:      General: Lids are normal.      Conjunctiva/sclera: Conjunctivae normal.   Neck:      Thyroid: No thyromegaly.   Cardiovascular:      Rate and Rhythm: Normal rate and regular rhythm.      Heart sounds: Normal heart sounds.   Pulmonary:      Effort: Pulmonary effort is normal.      Breath sounds: Normal breath sounds.   Musculoskeletal:         General: Normal range of motion.      Cervical back: Normal range of motion and neck supple.   Skin:     General: Skin is warm and dry.      Findings: Rash present.   Neurological:      General: No focal deficit present.      Mental Status: He is alert and oriented to person, place, and time.      Gait: Gait is intact.          Laboratory:  CBC:  Recent Labs   Lab 12/28/23  1445 05/23/24  0925 04/03/25  1106   WBC 7.73 10.77 9.10   RBC 5.23 5.52 5.24   Hemoglobin 15.2 15.7 14.9   Hematocrit 46.8 53.1 49.6   Platelet Count 338 235 260   MCV 89.5 96.2 H 94.7   MCH 29.1 28.4 28.4   MCHC 32.5 29.6 L 30.0 L     CMP:  Recent Labs   Lab 12/28/23  1445 05/23/24  0925 05/24/24  1054 04/03/25  1106   Glucose 97 82  --  80 L   Calcium 9.2 9.7  --  9.1   Albumin 2.9 L 4.0  --  3.7   Total Protein 7.7 7.6  --  6.8   Sodium 139 139  --  141 "   Potassium 5.1 7.6 HH   < > 4.7   CO2 32 31  --  32 H   Chloride 102 107  --  103   BUN 8 10  --  9   Alk Phos 62 110  --  89   ALT 50 24  --  15   AST 48 H 21  --  16   Bilirubin, Total 1.0 0.7  --  0.4    < > = values in this interval not displayed.     LIPIDS:  Recent Labs   Lab 04/12/23  1121 05/23/24  0925 07/16/24  0826 04/03/25  1106 04/03/25  1627   TSH  --  1.280  --   --  1.934   HDL Cholesterol 57  --  43 55  --    Cholesterol 157  --  138 159  --    Triglycerides 59  --  130 60  --    LDL Calculated 88  --  69 92  --    Cholesterol/HDL Ratio (Risk Factor) 2.8  --  3.2 2.9  --    Non-  --  95 104  --      TSH:  Recent Labs   Lab 05/23/24  0925 04/03/25  1627   TSH 1.280 1.934     A1C:  Recent Labs   Lab 08/22/22  1418   Hemoglobin A1C 5.7       Assessment/Plan     Andrez Wen is a 59 y.o.male with:     1. Disorder of skin  -     sulfamethoxazole-trimethoprim 800-160mg (BACTRIM DS) 800-160 mg Tab; Take 1 tablet by mouth 2 (two) times daily.  Dispense: 14 tablet; Refill: 0         Total time spent face-to-face and non-face-to-face coordinating care for this encounter was: 10 min    Chronic conditions status updated as per HPI.  Other than changes above, cont current medications and maintain follow up with specialists.  Return to clinic prn if symptoms worsen or fail to improve.    Mee Kilpatrick, FNP  [facility]         [1]   Social History  Tobacco Use    Smoking status: Every Day     Current packs/day: 0.25     Average packs/day: 2.2 packs/day for 51.6 years (111.9 ttl pk-yrs)     Types: Cigarettes     Start date: 1974     Passive exposure: Current    Smokeless tobacco: Never   Substance Use Topics    Alcohol use: Yes     Comment: vodka or crown sometimes.    Drug use: Yes     Frequency: 7.0 times per week     Types: Marijuana     Comment: tries not to medical card   [2]   Outpatient Encounter Medications as of 7/22/2025   Medication Sig Dispense Refill    albuterol (PROVENTIL) 2.5 mg  /3 mL (0.083 %) nebulizer solution Take 3 mLs (2.5 mg total) by nebulization 2 (two) times a day. 90 mL 2    budesonide-glycopyr-formoterol (BREZTRI AEROSPHERE) 160-9-4.8 mcg/actuation HFAA Inhale 2 puffs into the lungs 2 (two) times daily. 10.7 g 5    buPROPion (WELLBUTRIN SR) 150 MG TBSR 12 hr tablet Take 1 tablet (150 mg total) by mouth 2 (two) times daily. 60 tablet 11    busPIRone (BUSPAR) 5 MG Tab Take 1 tablet (5 mg total) by mouth 2 (two) times daily. 60 tablet 11    HYDROcodone-acetaminophen (NORCO) 7.5-325 mg per tablet Take 1 tablet by mouth 2 (two) times daily as needed.      pantoprazole (PROTONIX) 40 MG tablet TAKE 1 TABLET BY MOUTH ONCE DAILY 30 tablet 10    pregabalin (LYRICA) 75 MG capsule Take 75 mg by mouth 3 (three) times daily.      rosuvastatin (CRESTOR) 20 MG tablet TAKE 1 TABLET BY MOUTH EACH EVENING 30 tablet 10    tiZANidine (ZANAFLEX) 4 MG tablet Take 1 tablet (4 mg total) by mouth every 8 (eight) hours. 270 tablet 1    VENTOLIN HFA 90 mcg/actuation inhaler INHALE TWO (2) PUFFS BY MOUTH EVERY 4 HOURS 18 g 5    EScitalopram oxalate (LEXAPRO) 5 MG Tab Take 1 tablet by mouth once daily (Patient not taking: Reported on 7/22/2025) 30 tablet 5    silver sulfADIAZINE 1% (SILVADENE) 1 % cream APPLY EXTERNALLY TWICE DAILY TO LEGS (Patient not taking: Reported on 7/22/2025) 400 g 10    sulfamethoxazole-trimethoprim 800-160mg (BACTRIM DS) 800-160 mg Tab Take 1 tablet by mouth 2 (two) times daily. 14 tablet 0     No facility-administered encounter medications on file as of 7/22/2025.

## 2025-08-13 ENCOUNTER — TELEPHONE (OUTPATIENT)
Dept: PULMONOLOGY | Facility: CLINIC | Age: 60
End: 2025-08-13
Payer: MEDICARE

## (undated) DEVICE — GLOVE SENSICARE PI ALOE 8

## (undated) DEVICE — NDL TUOHY 22G X 3.5

## (undated) DEVICE — CATH IV INTROCAN 22G X 1

## (undated) DEVICE — KIT IV START

## (undated) DEVICE — SET EXT STD BORE CATH 7.6IN

## (undated) DEVICE — GLOVE SENSICARE PI SURG 6.5

## (undated) DEVICE — TRAY NERVE BLOCK UNIV 10/CA

## (undated) DEVICE — OXISENSOR ADULT DIGIT N/S

## (undated) DEVICE — SLIPPER FALL PREV YEL BARIAT

## (undated) DEVICE — APPLICATOR CHLORAPREP ORN 26ML

## (undated) DEVICE — SOL CONTINU-FLO SET 2 LAV

## (undated) DEVICE — CATH INTROCAN SAF 2 IV 22GX1IN